# Patient Record
Sex: FEMALE | NOT HISPANIC OR LATINO | Employment: STUDENT | URBAN - METROPOLITAN AREA
[De-identification: names, ages, dates, MRNs, and addresses within clinical notes are randomized per-mention and may not be internally consistent; named-entity substitution may affect disease eponyms.]

---

## 2018-08-28 ENCOUNTER — TELEPHONE (OUTPATIENT)
Dept: RHEUMATOLOGY | Facility: CLINIC | Age: 33
End: 2018-08-28

## 2018-08-28 NOTE — TELEPHONE ENCOUNTER
[facility]    Phone Message    May a detailed message be left on voicemail: yes    Reason for Call: Other: Dianne Mendoza referring for Systemic Lupus.      Action Taken: Message routed to:  [facility] Rheum

## 2018-09-07 NOTE — TELEPHONE ENCOUNTER
Call was placed to patient regarding the referral we received for SLE from Dianne Kate at [facility], however, there was no answer. Message was left asking patient to call the clinic back to complete an intake form and discuss if there are outside records needed. Awaiting a call back from the patient.  Sharon Riley CMA  9/7/2018 11:22 AM

## 2018-09-07 NOTE — TELEPHONE ENCOUNTER
[facility] Call [facility]    Phone Message    May a detailed message be left on voicemail: yes    Reason for Call: Other: Pt was calling to return Sharon's call for the Intake Process.  Please have Sharon follow up with pt - pt is available any time after 11am.  Thank you!     Action Taken: Other: [facility]

## 2018-09-18 NOTE — TELEPHONE ENCOUNTER
Final attempt to complete the New Patient Intake process. Unfortunately, we are not able to move forward until the form is completed. Closing the referral request until we hear from the patient.   Sharon Riley CMA  9/18/2018 3:03 PM

## 2018-10-02 NOTE — TELEPHONE ENCOUNTER
[facility]    Phone Message    May a detailed message be left on voicemail: yes    Reason for Call: Other: The pt is calling to speak with Sharon and get her intake done. Please call the pt. The pt indicated she had called before but hadn't had a call back, not sure when. Thanks.      Action Taken: Message routed to:  [facility]

## 2018-10-03 NOTE — TELEPHONE ENCOUNTER
[facility]    Phone Message    May a detailed message be left on voicemail: yes    Reason for Call: Other: Pt calling again, requesting to speak with Sharon. She is upset as she states she has tried for several days to get ahold of her and has not received a call. She requests a call back tomorrow; she states she is best reached from 12:00 and after. Please advise.    Action Taken: Message routed to:  [facility] Rheum

## 2018-10-05 NOTE — TELEPHONE ENCOUNTER
[facility]    Phone Message    May a detailed message be left on voicemail: yes    Reason for Call: Other: Pt calling back for intake. she is avail all next week.      Action Taken: Message routed to:  [facility]

## 2018-10-12 NOTE — TELEPHONE ENCOUNTER
Call attempted to patient regarding the referral without success. Message left.   Sharon Riley CMA  10/12/2018 3:28 PM

## 2018-10-23 NOTE — TELEPHONE ENCOUNTER
Another attempt made to reach patient without success. Message left.  Sharon Riley CMA  10/23/2018 2:05 PM

## 2021-06-02 ENCOUNTER — RECORDS - HEALTHEAST (OUTPATIENT)
Dept: ADMINISTRATIVE | Facility: CLINIC | Age: 36
End: 2021-06-02

## 2021-06-16 PROBLEM — K59.01 SLOW TRANSIT CONSTIPATION: Status: ACTIVE | Noted: 2018-04-02

## 2021-06-16 PROBLEM — N18.9 CHRONIC RENAL FAILURE: Status: ACTIVE | Noted: 2018-03-31

## 2021-06-16 PROBLEM — N18.6 END STAGE RENAL DISEASE (H): Status: ACTIVE | Noted: 2018-04-01

## 2021-06-16 PROBLEM — I15.9 SECONDARY HYPERTENSION: Status: ACTIVE | Noted: 2017-03-21

## 2021-07-08 ENCOUNTER — TRANSFERRED RECORDS (OUTPATIENT)
Dept: HEALTH INFORMATION MANAGEMENT | Facility: CLINIC | Age: 36
End: 2021-07-08
Payer: COMMERCIAL

## 2022-01-05 ENCOUNTER — MEDICAL CORRESPONDENCE (OUTPATIENT)
Dept: HEALTH INFORMATION MANAGEMENT | Facility: CLINIC | Age: 37
End: 2022-01-05
Payer: COMMERCIAL

## 2022-01-06 ENCOUNTER — TRANSCRIBE ORDERS (OUTPATIENT)
Dept: MULTI SPECIALTY CLINIC | Facility: CLINIC | Age: 37
End: 2022-01-06
Payer: COMMERCIAL

## 2022-01-06 DIAGNOSIS — M32.9 SYSTEMIC LUPUS ERYTHEMATOSUS, UNSPECIFIED SLE TYPE, UNSPECIFIED ORGAN INVOLVEMENT STATUS (H): Primary | ICD-10-CM

## 2022-01-06 DIAGNOSIS — Z94.0 DECEASED-DONOR KIDNEY TRANSPLANT: ICD-10-CM

## 2023-01-02 ENCOUNTER — TRANSFERRED RECORDS (OUTPATIENT)
Dept: HEALTH INFORMATION MANAGEMENT | Facility: CLINIC | Age: 38
End: 2023-01-02

## 2023-01-02 ENCOUNTER — APPOINTMENT (OUTPATIENT)
Dept: RADIOLOGY | Facility: CLINIC | Age: 38
DRG: 698 | End: 2023-01-02
Attending: NURSE PRACTITIONER
Payer: MEDICARE

## 2023-01-02 ENCOUNTER — APPOINTMENT (OUTPATIENT)
Dept: CT IMAGING | Facility: CLINIC | Age: 38
DRG: 698 | End: 2023-01-02
Attending: EMERGENCY MEDICINE
Payer: MEDICARE

## 2023-01-02 ENCOUNTER — HOSPITAL ENCOUNTER (INPATIENT)
Facility: CLINIC | Age: 38
LOS: 4 days | Discharge: HOME | DRG: 698 | End: 2023-01-06
Attending: EMERGENCY MEDICINE | Admitting: STUDENT IN AN ORGANIZED HEALTH CARE EDUCATION/TRAINING PROGRAM
Payer: MEDICARE

## 2023-01-02 DIAGNOSIS — N12 PYELONEPHRITIS: ICD-10-CM

## 2023-01-02 DIAGNOSIS — L93.0 LUPUS ERYTHEMATOSUS, UNSPECIFIED FORM: Primary | ICD-10-CM

## 2023-01-02 DIAGNOSIS — A41.9 SEPSIS, DUE TO UNSPECIFIED ORGANISM, UNSPECIFIED WHETHER ACUTE ORGAN DYSFUNCTION PRESENT (H): ICD-10-CM

## 2023-01-02 DIAGNOSIS — R51.9 FRONTAL HEADACHE: ICD-10-CM

## 2023-01-02 PROBLEM — G47.00 INSOMNIA: Status: ACTIVE | Noted: 2019-09-25

## 2023-01-02 PROBLEM — G43.909 MIGRAINE HEADACHE: Status: ACTIVE | Noted: 2019-09-25

## 2023-01-02 LAB
ALBUMIN UR-MCNC: 300 MG/DL
ANION GAP SERPL CALCULATED.3IONS-SCNC: 11 MMOL/L (ref 5–18)
ANION GAP SERPL CALCULATED.3IONS-SCNC: 11 MMOL/L (ref 5–18)
APPEARANCE UR: ABNORMAL
BACTERIA #/AREA URNS HPF: ABNORMAL /HPF
BASOPHILS # BLD MANUAL: 0 10E3/UL (ref 0–0.2)
BASOPHILS NFR BLD MANUAL: 0 %
BILIRUB UR QL STRIP: NEGATIVE
BUN SERPL-MCNC: 35 MG/DL (ref 8–22)
BUN SERPL-MCNC: 39 MG/DL (ref 8–22)
CALCIUM SERPL-MCNC: 10.1 MG/DL (ref 8.5–10.5)
CALCIUM SERPL-MCNC: 9.1 MG/DL (ref 8.5–10.5)
CHLORIDE BLD-SCNC: 103 MMOL/L (ref 98–107)
CHLORIDE BLD-SCNC: 106 MMOL/L (ref 98–107)
CO2 SERPL-SCNC: 20 MMOL/L (ref 22–31)
CO2 SERPL-SCNC: 20 MMOL/L (ref 22–31)
COLOR UR AUTO: YELLOW
CREAT SERPL-MCNC: 2.01 MG/DL (ref 0.6–1.1)
CREAT SERPL-MCNC: 2.37 MG/DL (ref 0.6–1.1)
EOSINOPHIL # BLD MANUAL: 0 10E3/UL (ref 0–0.7)
EOSINOPHIL NFR BLD MANUAL: 0 %
ERYTHROCYTE [DISTWIDTH] IN BLOOD BY AUTOMATED COUNT: 14.7 % (ref 10–15)
FLUAV RNA SPEC QL NAA+PROBE: NEGATIVE
FLUBV RNA RESP QL NAA+PROBE: NEGATIVE
GFR SERPL CREATININE-BSD FRML MDRD: 26 ML/MIN/1.73M2
GFR SERPL CREATININE-BSD FRML MDRD: 32 ML/MIN/1.73M2
GLUCOSE BLD-MCNC: 101 MG/DL (ref 70–125)
GLUCOSE BLD-MCNC: 116 MG/DL (ref 70–125)
GLUCOSE UR STRIP-MCNC: NEGATIVE MG/DL
HCG SERPL QL: NEGATIVE
HCT VFR BLD AUTO: 35.3 % (ref 35–47)
HGB BLD-MCNC: 11.1 G/DL (ref 11.7–15.7)
HGB UR QL STRIP: ABNORMAL
KETONES UR STRIP-MCNC: ABNORMAL MG/DL
LACTATE SERPL-SCNC: 1.5 MMOL/L (ref 0.7–2)
LEUKOCYTE ESTERASE UR QL STRIP: ABNORMAL
LYMPHOCYTES # BLD MANUAL: 0.3 10E3/UL (ref 0.8–5.3)
LYMPHOCYTES NFR BLD MANUAL: 2 %
MCH RBC QN AUTO: 28 PG (ref 26.5–33)
MCHC RBC AUTO-ENTMCNC: 31.4 G/DL (ref 31.5–36.5)
MCV RBC AUTO: 89 FL (ref 78–100)
MONOCYTES # BLD MANUAL: 1.2 10E3/UL (ref 0–1.3)
MONOCYTES NFR BLD MANUAL: 8 %
MUCOUS THREADS #/AREA URNS LPF: PRESENT /LPF
NEUTROPHILS # BLD MANUAL: 13.1 10E3/UL (ref 1.6–8.3)
NEUTROPHILS NFR BLD MANUAL: 90 %
NITRATE UR QL: NEGATIVE
PH UR STRIP: 6 [PH] (ref 5–7)
PLAT MORPH BLD: ABNORMAL
PLATELET # BLD AUTO: 202 10E3/UL (ref 150–450)
POTASSIUM BLD-SCNC: 4.4 MMOL/L (ref 3.5–5)
POTASSIUM BLD-SCNC: 5 MMOL/L (ref 3.5–5)
RBC # BLD AUTO: 3.97 10E6/UL (ref 3.8–5.2)
RBC MORPH BLD: ABNORMAL
RBC URINE: 15 /HPF
RSV RNA SPEC NAA+PROBE: NEGATIVE
SARS-COV-2 RNA RESP QL NAA+PROBE: NEGATIVE
SODIUM SERPL-SCNC: 134 MMOL/L (ref 136–145)
SODIUM SERPL-SCNC: 137 MMOL/L (ref 136–145)
SP GR UR STRIP: 1.03 (ref 1–1.03)
SQUAMOUS EPITHELIAL: 2 /HPF
TRANSITIONAL EPI: <1 /HPF
UROBILINOGEN UR STRIP-MCNC: 2 MG/DL
WBC # BLD AUTO: 14.6 10E3/UL (ref 4–11)
WBC URINE: 18 /HPF

## 2023-01-02 PROCEDURE — 93010 ELECTROCARDIOGRAM REPORT: CPT | Mod: HIP | Performed by: INTERNAL MEDICINE

## 2023-01-02 PROCEDURE — 36415 COLL VENOUS BLD VENIPUNCTURE: CPT | Performed by: EMERGENCY MEDICINE

## 2023-01-02 PROCEDURE — 250N000011 HC RX IP 250 OP 636: Performed by: NURSE PRACTITIONER

## 2023-01-02 PROCEDURE — 250N000013 HC RX MED GY IP 250 OP 250 PS 637: Performed by: EMERGENCY MEDICINE

## 2023-01-02 PROCEDURE — 85027 COMPLETE CBC AUTOMATED: CPT | Performed by: NURSE PRACTITIONER

## 2023-01-02 PROCEDURE — 250N000009 HC RX 250: Performed by: EMERGENCY MEDICINE

## 2023-01-02 PROCEDURE — 70450 CT HEAD/BRAIN W/O DYE: CPT | Mod: MF

## 2023-01-02 PROCEDURE — 71046 X-RAY EXAM CHEST 2 VIEWS: CPT

## 2023-01-02 PROCEDURE — 99285 EMERGENCY DEPT VISIT HI MDM: CPT | Mod: 25

## 2023-01-02 PROCEDURE — 87086 URINE CULTURE/COLONY COUNT: CPT | Performed by: EMERGENCY MEDICINE

## 2023-01-02 PROCEDURE — 258N000003 HC RX IP 258 OP 636: Performed by: EMERGENCY MEDICINE

## 2023-01-02 PROCEDURE — 250N000011 HC RX IP 250 OP 636: Performed by: EMERGENCY MEDICINE

## 2023-01-02 PROCEDURE — 87040 BLOOD CULTURE FOR BACTERIA: CPT | Performed by: EMERGENCY MEDICINE

## 2023-01-02 PROCEDURE — 87637 SARSCOV2&INF A&B&RSV AMP PRB: CPT | Performed by: NURSE PRACTITIONER

## 2023-01-02 PROCEDURE — 272N000451 HC KIT SHRLOCK 5FR POWER PICC DOUBLE LUMEN

## 2023-01-02 PROCEDURE — 120N000001 HC R&B MED SURG/OB

## 2023-01-02 PROCEDURE — 36415 COLL VENOUS BLD VENIPUNCTURE: CPT | Performed by: NURSE PRACTITIONER

## 2023-01-02 PROCEDURE — 81001 URINALYSIS AUTO W/SCOPE: CPT | Performed by: EMERGENCY MEDICINE

## 2023-01-02 PROCEDURE — 36569 INSJ PICC 5 YR+ W/O IMAGING: CPT

## 2023-01-02 PROCEDURE — 74176 CT ABD & PELVIS W/O CONTRAST: CPT | Mod: MG

## 2023-01-02 PROCEDURE — 81003 URINALYSIS AUTO W/O SCOPE: CPT | Performed by: EMERGENCY MEDICINE

## 2023-01-02 PROCEDURE — 83605 ASSAY OF LACTIC ACID: CPT | Performed by: EMERGENCY MEDICINE

## 2023-01-02 PROCEDURE — 258N000003 HC RX IP 258 OP 636: Performed by: NURSE PRACTITIONER

## 2023-01-02 PROCEDURE — 84703 CHORIONIC GONADOTROPIN ASSAY: CPT | Performed by: NURSE PRACTITIONER

## 2023-01-02 PROCEDURE — 96375 TX/PRO/DX INJ NEW DRUG ADDON: CPT

## 2023-01-02 PROCEDURE — 85007 BL SMEAR W/DIFF WBC COUNT: CPT | Performed by: NURSE PRACTITIONER

## 2023-01-02 PROCEDURE — 80048 BASIC METABOLIC PNL TOTAL CA: CPT | Performed by: STUDENT IN AN ORGANIZED HEALTH CARE EDUCATION/TRAINING PROGRAM

## 2023-01-02 PROCEDURE — 96374 THER/PROPH/DIAG INJ IV PUSH: CPT

## 2023-01-02 PROCEDURE — 80048 BASIC METABOLIC PNL TOTAL CA: CPT | Performed by: NURSE PRACTITIONER

## 2023-01-02 PROCEDURE — 93005 ELECTROCARDIOGRAM TRACING: CPT

## 2023-01-02 RX ORDER — NALOXONE HYDROCHLORIDE 0.4 MG/ML
0.2 INJECTION, SOLUTION INTRAMUSCULAR; INTRAVENOUS; SUBCUTANEOUS
Status: DISCONTINUED | OUTPATIENT
Start: 2023-01-02 | End: 2023-01-06 | Stop reason: HOSPADM

## 2023-01-02 RX ORDER — LIDOCAINE 40 MG/G
CREAM TOPICAL
Status: DISCONTINUED | OUTPATIENT
Start: 2023-01-02 | End: 2023-01-04

## 2023-01-02 RX ORDER — ONDANSETRON 4 MG/1
4 TABLET, ORALLY DISINTEGRATING ORAL EVERY 6 HOURS PRN
Status: DISCONTINUED | OUTPATIENT
Start: 2023-01-02 | End: 2023-01-06 | Stop reason: HOSPADM

## 2023-01-02 RX ORDER — KETOROLAC TROMETHAMINE 15 MG/ML
15 INJECTION, SOLUTION INTRAMUSCULAR; INTRAVENOUS ONCE
Status: COMPLETED | OUTPATIENT
Start: 2023-01-02 | End: 2023-01-02

## 2023-01-02 RX ORDER — ONDANSETRON 4 MG/1
4 TABLET, ORALLY DISINTEGRATING ORAL EVERY 6 HOURS PRN
COMMUNITY
Start: 2023-01-01

## 2023-01-02 RX ORDER — NALOXONE HYDROCHLORIDE 0.4 MG/ML
0.4 INJECTION, SOLUTION INTRAMUSCULAR; INTRAVENOUS; SUBCUTANEOUS
Status: DISCONTINUED | OUTPATIENT
Start: 2023-01-02 | End: 2023-01-06 | Stop reason: HOSPADM

## 2023-01-02 RX ORDER — NORGESTIMATE AND ETHINYL ESTRADIOL 7DAYSX3 28
1 KIT ORAL DAILY
COMMUNITY
Start: 2022-11-28

## 2023-01-02 RX ORDER — PROCHLORPERAZINE MALEATE 10 MG
10 TABLET ORAL EVERY 6 HOURS PRN
Status: DISCONTINUED | OUTPATIENT
Start: 2023-01-02 | End: 2023-01-06 | Stop reason: HOSPADM

## 2023-01-02 RX ORDER — DOXYCYCLINE HYCLATE 100 MG
100 TABLET ORAL 2 TIMES DAILY
COMMUNITY
Start: 2023-01-01

## 2023-01-02 RX ORDER — PANTOPRAZOLE SODIUM 40 MG/1
40 TABLET, DELAYED RELEASE ORAL AT BEDTIME
COMMUNITY
Start: 2022-12-22

## 2023-01-02 RX ORDER — ACETAMINOPHEN 650 MG/1
650 SUPPOSITORY RECTAL EVERY 6 HOURS PRN
Status: DISCONTINUED | OUTPATIENT
Start: 2023-01-02 | End: 2023-01-02

## 2023-01-02 RX ORDER — TRAZODONE HYDROCHLORIDE 100 MG/1
100 TABLET ORAL AT BEDTIME
Status: DISCONTINUED | OUTPATIENT
Start: 2023-01-02 | End: 2023-01-06 | Stop reason: HOSPADM

## 2023-01-02 RX ORDER — OXYCODONE HYDROCHLORIDE 5 MG/1
5 TABLET ORAL EVERY 4 HOURS PRN
Status: DISCONTINUED | OUTPATIENT
Start: 2023-01-02 | End: 2023-01-04

## 2023-01-02 RX ORDER — ACETAMINOPHEN 325 MG/1
650 TABLET ORAL EVERY 6 HOURS PRN
Status: DISCONTINUED | OUTPATIENT
Start: 2023-01-02 | End: 2023-01-02

## 2023-01-02 RX ORDER — PROCHLORPERAZINE 25 MG
25 SUPPOSITORY, RECTAL RECTAL EVERY 12 HOURS PRN
Status: DISCONTINUED | OUTPATIENT
Start: 2023-01-02 | End: 2023-01-06 | Stop reason: HOSPADM

## 2023-01-02 RX ORDER — PREDNISONE 5 MG/1
5 TABLET ORAL DAILY
COMMUNITY

## 2023-01-02 RX ORDER — SULFAMETHOXAZOLE AND TRIMETHOPRIM 400; 80 MG/1; MG/1
1 TABLET ORAL
COMMUNITY
Start: 2022-12-16

## 2023-01-02 RX ORDER — SODIUM BICARBONATE 650 MG/1
650 TABLET ORAL 2 TIMES DAILY
Status: DISCONTINUED | OUTPATIENT
Start: 2023-01-02 | End: 2023-01-06 | Stop reason: HOSPADM

## 2023-01-02 RX ORDER — ACETAMINOPHEN 325 MG/1
650 TABLET ORAL ONCE
Status: COMPLETED | OUTPATIENT
Start: 2023-01-02 | End: 2023-01-02

## 2023-01-02 RX ORDER — CALCIUM CARBONATE 500 MG/1
3000 TABLET, CHEWABLE ORAL
COMMUNITY
Start: 2022-12-16

## 2023-01-02 RX ORDER — ONDANSETRON 2 MG/ML
4 INJECTION INTRAMUSCULAR; INTRAVENOUS ONCE
Status: COMPLETED | OUTPATIENT
Start: 2023-01-02 | End: 2023-01-02

## 2023-01-02 RX ORDER — TRAZODONE HYDROCHLORIDE 50 MG/1
100 TABLET, FILM COATED ORAL AT BEDTIME
COMMUNITY
Start: 2022-12-10

## 2023-01-02 RX ORDER — SULFAMETHOXAZOLE AND TRIMETHOPRIM 400; 80 MG/1; MG/1
1 TABLET ORAL
Status: DISCONTINUED | OUTPATIENT
Start: 2023-01-04 | End: 2023-01-02

## 2023-01-02 RX ORDER — ROSUVASTATIN CALCIUM 20 MG/1
20 TABLET, COATED ORAL AT BEDTIME
COMMUNITY
Start: 2022-10-28

## 2023-01-02 RX ORDER — PANTOPRAZOLE SODIUM 20 MG/1
40 TABLET, DELAYED RELEASE ORAL AT BEDTIME
Status: DISCONTINUED | OUTPATIENT
Start: 2023-01-02 | End: 2023-01-06 | Stop reason: HOSPADM

## 2023-01-02 RX ORDER — CHOLECALCIFEROL (VITAMIN D3) 25 MCG
25 TABLET ORAL 2 TIMES DAILY
COMMUNITY
Start: 2022-12-22

## 2023-01-02 RX ORDER — ACETAMINOPHEN 650 MG/1
650 SUPPOSITORY RECTAL EVERY 6 HOURS
Status: DISCONTINUED | OUTPATIENT
Start: 2023-01-02 | End: 2023-01-04

## 2023-01-02 RX ORDER — ROSUVASTATIN CALCIUM 10 MG/1
20 TABLET, COATED ORAL AT BEDTIME
Status: DISCONTINUED | OUTPATIENT
Start: 2023-01-02 | End: 2023-01-06 | Stop reason: HOSPADM

## 2023-01-02 RX ORDER — GABAPENTIN 300 MG/1
300 CAPSULE ORAL 3 TIMES DAILY PRN
Status: DISCONTINUED | OUTPATIENT
Start: 2023-01-02 | End: 2023-01-04

## 2023-01-02 RX ORDER — LIDOCAINE 40 MG/G
CREAM TOPICAL
Status: DISCONTINUED | OUTPATIENT
Start: 2023-01-02 | End: 2023-01-06 | Stop reason: HOSPADM

## 2023-01-02 RX ORDER — ONDANSETRON 2 MG/ML
4 INJECTION INTRAMUSCULAR; INTRAVENOUS EVERY 6 HOURS PRN
Status: DISCONTINUED | OUTPATIENT
Start: 2023-01-02 | End: 2023-01-06 | Stop reason: HOSPADM

## 2023-01-02 RX ORDER — AZATHIOPRINE 50 MG/1
75 TABLET ORAL DAILY
COMMUNITY

## 2023-01-02 RX ORDER — SODIUM BICARBONATE 650 MG/1
650 TABLET ORAL 2 TIMES DAILY
COMMUNITY
Start: 2022-09-19

## 2023-01-02 RX ORDER — ACETAMINOPHEN 325 MG/1
650 TABLET ORAL EVERY 6 HOURS
Status: DISCONTINUED | OUTPATIENT
Start: 2023-01-02 | End: 2023-01-05

## 2023-01-02 RX ORDER — SULFAMETHOXAZOLE AND TRIMETHOPRIM 400; 80 MG/1; MG/1
1 TABLET ORAL
Status: DISCONTINUED | OUTPATIENT
Start: 2023-01-04 | End: 2023-01-06 | Stop reason: HOSPADM

## 2023-01-02 RX ADMIN — SODIUM CHLORIDE 1000 ML: 9 INJECTION, SOLUTION INTRAVENOUS at 18:04

## 2023-01-02 RX ADMIN — SODIUM CHLORIDE 1000 ML: 9 INJECTION, SOLUTION INTRAVENOUS at 16:11

## 2023-01-02 RX ADMIN — KETOROLAC TROMETHAMINE 15 MG: 15 INJECTION, SOLUTION INTRAMUSCULAR; INTRAVENOUS at 16:13

## 2023-01-02 RX ADMIN — SODIUM CHLORIDE 1000 ML: 9 INJECTION, SOLUTION INTRAVENOUS at 21:09

## 2023-01-02 RX ADMIN — ACETAMINOPHEN 650 MG: 325 TABLET ORAL at 15:27

## 2023-01-02 RX ADMIN — SODIUM CHLORIDE 1000 ML: 9 INJECTION, SOLUTION INTRAVENOUS at 21:12

## 2023-01-02 RX ADMIN — CEFEPIME 2 G: 2 INJECTION, POWDER, FOR SOLUTION INTRAVENOUS at 21:09

## 2023-01-02 RX ADMIN — ACETAMINOPHEN 650 MG: 325 TABLET ORAL at 21:21

## 2023-01-02 RX ADMIN — ONDANSETRON 4 MG: 2 INJECTION INTRAMUSCULAR; INTRAVENOUS at 16:12

## 2023-01-02 RX ADMIN — VANCOMYCIN HYDROCHLORIDE 2000 MG: 5 INJECTION, POWDER, LYOPHILIZED, FOR SOLUTION INTRAVENOUS at 21:11

## 2023-01-02 RX ADMIN — LIDOCAINE HYDROCHLORIDE 2 ML: 10 INJECTION, SOLUTION EPIDURAL; INFILTRATION; INTRACAUDAL; PERINEURAL at 20:38

## 2023-01-02 ASSESSMENT — ENCOUNTER SYMPTOMS
COUGH: 0
FEVER: 1
SINUS PRESSURE: 1
DYSURIA: 1
DIARRHEA: 1
HEADACHES: 1
VOMITING: 0
MYALGIAS: 1
FREQUENCY: 1
NAUSEA: 1

## 2023-01-02 ASSESSMENT — ACTIVITIES OF DAILY LIVING (ADL)
ADLS_ACUITY_SCORE: 35
ADLS_ACUITY_SCORE: 37
ADLS_ACUITY_SCORE: 35

## 2023-01-02 NOTE — LETTER
[facility]  [address] [place] [id number]  Phone: [phone]  Fax: [phone]    January 6, 2023        Mey Dixon  [address] [facility] [facility] [address]          To whom it may concern:    RE: Mey Dixon was admitted to [facility] on 1/2/2023 and discharged on 1/6/2023.  Please excuse her from the work duties she had a during this time.      Please contact me for questions or concerns.      Sincerely,        Christopher Weeks, DO

## 2023-01-02 NOTE — ED TRIAGE NOTES
Pt reports onset of flu-like symptoms on Thursday. Started with headache and has developed congestion, cough, fever, nausea, body aches. Last dose tylenol at 0830 approximately. Pt is anxious in triage.    Was seen in urgency room last night. Tested negative for covid and flu. Diagnosed with sinus infection and discharged with antibiotics.     Triage Assessment     Row Name 01/02/23 1523       Triage Assessment (Adult)    Airway WDL WDL       Respiratory WDL    Respiratory WDL X;rhythm/pattern;cough    Rhythm/Pattern, Respiratory shortness of breath    Cough Frequency infrequent       Skin Circulation/Temperature WDL    Skin Circulation/Temperature WDL WDL       Cardiac WDL    Cardiac WDL X;rhythm    Pulse Rate & Regularity tachycardic       Peripheral/Neurovascular WDL    Peripheral Neurovascular WDL WDL       Cognitive/Neuro/Behavioral WDL    Cognitive/Neuro/Behavioral WDL X    Level of Consciousness alert    Arousal Level opens eyes spontaneously    Mood/Behavior anxious

## 2023-01-03 PROBLEM — L93.0 LUPUS ERYTHEMATOSUS: Status: ACTIVE | Noted: 2023-01-03

## 2023-01-03 PROBLEM — M87.00 AVASCULAR NECROSIS (H): Status: RESOLVED | Noted: 2023-01-03 | Resolved: 2023-01-03

## 2023-01-03 PROBLEM — M87.00 AVASCULAR NECROSIS (H): Status: ACTIVE | Noted: 2023-01-03

## 2023-01-03 PROBLEM — M87.059: Status: ACTIVE | Noted: 2023-01-03

## 2023-01-03 PROBLEM — N10 PYELONEPHRITIS, ACUTE: Status: ACTIVE | Noted: 2023-01-03

## 2023-01-03 PROBLEM — R00.0 SINUS TACHYCARDIA: Status: ACTIVE | Noted: 2023-01-03

## 2023-01-03 LAB
ANION GAP SERPL CALCULATED.3IONS-SCNC: 8 MMOL/L (ref 5–18)
ATRIAL RATE - MUSE: 120 BPM
BASOPHILS # BLD AUTO: 0 10E3/UL (ref 0–0.2)
BASOPHILS NFR BLD AUTO: 0 %
BUN SERPL-MCNC: 38 MG/DL (ref 8–22)
CALCIUM SERPL-MCNC: 8 MG/DL (ref 8.5–10.5)
CHLORIDE BLD-SCNC: 112 MMOL/L (ref 98–107)
CO2 SERPL-SCNC: 19 MMOL/L (ref 22–31)
CREAT SERPL-MCNC: 1.89 MG/DL (ref 0.6–1.1)
DIASTOLIC BLOOD PRESSURE - MUSE: NORMAL MMHG
EOSINOPHIL # BLD AUTO: 0 10E3/UL (ref 0–0.7)
EOSINOPHIL NFR BLD AUTO: 0 %
ERYTHROCYTE [DISTWIDTH] IN BLOOD BY AUTOMATED COUNT: 15.1 % (ref 10–15)
GFR SERPL CREATININE-BSD FRML MDRD: 35 ML/MIN/1.73M2
GLUCOSE BLD-MCNC: 100 MG/DL (ref 70–125)
HCT VFR BLD AUTO: 28.8 % (ref 35–47)
HGB BLD-MCNC: 9 G/DL (ref 11.7–15.7)
IMM GRANULOCYTES # BLD: 0.1 10E3/UL
IMM GRANULOCYTES NFR BLD: 1 %
INTERPRETATION ECG - MUSE: NORMAL
LACTATE SERPL-SCNC: 1.1 MMOL/L (ref 0.7–2)
LYMPHOCYTES # BLD AUTO: 0.2 10E3/UL (ref 0.8–5.3)
LYMPHOCYTES NFR BLD AUTO: 1 %
MCH RBC QN AUTO: 28.5 PG (ref 26.5–33)
MCHC RBC AUTO-ENTMCNC: 31.3 G/DL (ref 31.5–36.5)
MCV RBC AUTO: 91 FL (ref 78–100)
MONOCYTES # BLD AUTO: 1.6 10E3/UL (ref 0–1.3)
MONOCYTES NFR BLD AUTO: 13 %
NEUTROPHILS # BLD AUTO: 10.7 10E3/UL (ref 1.6–8.3)
NEUTROPHILS NFR BLD AUTO: 85 %
NRBC # BLD AUTO: 0 10E3/UL
NRBC BLD AUTO-RTO: 0 /100
P AXIS - MUSE: 58 DEGREES
PLATELET # BLD AUTO: 138 10E3/UL (ref 150–450)
POTASSIUM BLD-SCNC: 4.1 MMOL/L (ref 3.5–5)
PR INTERVAL - MUSE: 126 MS
QRS DURATION - MUSE: 68 MS
QT - MUSE: 306 MS
QTC - MUSE: 432 MS
R AXIS - MUSE: 18 DEGREES
RBC # BLD AUTO: 3.16 10E6/UL (ref 3.8–5.2)
SODIUM SERPL-SCNC: 139 MMOL/L (ref 136–145)
SYSTOLIC BLOOD PRESSURE - MUSE: NORMAL MMHG
T AXIS - MUSE: 31 DEGREES
VENTRICULAR RATE- MUSE: 120 BPM
WBC # BLD AUTO: 12.6 10E3/UL (ref 4–11)

## 2023-01-03 PROCEDURE — 250N000013 HC RX MED GY IP 250 OP 250 PS 637

## 2023-01-03 PROCEDURE — 258N000003 HC RX IP 258 OP 636: Performed by: STUDENT IN AN ORGANIZED HEALTH CARE EDUCATION/TRAINING PROGRAM

## 2023-01-03 PROCEDURE — 99222 1ST HOSP IP/OBS MODERATE 55: CPT | Performed by: PHYSICIAN ASSISTANT

## 2023-01-03 PROCEDURE — 99223 1ST HOSP IP/OBS HIGH 75: CPT | Mod: AI | Performed by: STUDENT IN AN ORGANIZED HEALTH CARE EDUCATION/TRAINING PROGRAM

## 2023-01-03 PROCEDURE — 80048 BASIC METABOLIC PNL TOTAL CA: CPT | Performed by: STUDENT IN AN ORGANIZED HEALTH CARE EDUCATION/TRAINING PROGRAM

## 2023-01-03 PROCEDURE — 250N000011 HC RX IP 250 OP 636: Performed by: STUDENT IN AN ORGANIZED HEALTH CARE EDUCATION/TRAINING PROGRAM

## 2023-01-03 PROCEDURE — 83605 ASSAY OF LACTIC ACID: CPT | Performed by: STUDENT IN AN ORGANIZED HEALTH CARE EDUCATION/TRAINING PROGRAM

## 2023-01-03 PROCEDURE — 250N000012 HC RX MED GY IP 250 OP 636 PS 637

## 2023-01-03 PROCEDURE — 120N000001 HC R&B MED SURG/OB

## 2023-01-03 PROCEDURE — 250N000013 HC RX MED GY IP 250 OP 250 PS 637: Performed by: STUDENT IN AN ORGANIZED HEALTH CARE EDUCATION/TRAINING PROGRAM

## 2023-01-03 PROCEDURE — 85025 COMPLETE CBC W/AUTO DIFF WBC: CPT | Performed by: STUDENT IN AN ORGANIZED HEALTH CARE EDUCATION/TRAINING PROGRAM

## 2023-01-03 RX ORDER — CALCIUM CARBONATE 500 MG/1
3000 TABLET, CHEWABLE ORAL
Status: DISCONTINUED | OUTPATIENT
Start: 2023-01-03 | End: 2023-01-06 | Stop reason: HOSPADM

## 2023-01-03 RX ORDER — PREDNISONE 5 MG/1
5 TABLET ORAL DAILY
Status: DISCONTINUED | OUTPATIENT
Start: 2023-01-03 | End: 2023-01-06 | Stop reason: HOSPADM

## 2023-01-03 RX ORDER — FLUTICASONE PROPIONATE 50 MCG
1 SPRAY, SUSPENSION (ML) NASAL DAILY
Status: DISCONTINUED | OUTPATIENT
Start: 2023-01-03 | End: 2023-01-06 | Stop reason: HOSPADM

## 2023-01-03 RX ORDER — SODIUM CHLORIDE 9 MG/ML
INJECTION, SOLUTION INTRAVENOUS CONTINUOUS
Status: DISCONTINUED | OUTPATIENT
Start: 2023-01-03 | End: 2023-01-05

## 2023-01-03 RX ORDER — VITAMIN B COMPLEX
25 TABLET ORAL 2 TIMES DAILY
Status: DISCONTINUED | OUTPATIENT
Start: 2023-01-03 | End: 2023-01-06 | Stop reason: HOSPADM

## 2023-01-03 RX ADMIN — ONDANSETRON 4 MG: 2 INJECTION INTRAMUSCULAR; INTRAVENOUS at 23:54

## 2023-01-03 RX ADMIN — ROSUVASTATIN CALCIUM 20 MG: 10 TABLET, FILM COATED ORAL at 00:10

## 2023-01-03 RX ADMIN — ACETAMINOPHEN 650 MG: 325 TABLET ORAL at 00:10

## 2023-01-03 RX ADMIN — OXYCODONE HYDROCHLORIDE 5 MG: 5 TABLET ORAL at 23:54

## 2023-01-03 RX ADMIN — PREDNISONE 5 MG: 5 TABLET ORAL at 13:08

## 2023-01-03 RX ADMIN — SODIUM BICARBONATE 650 MG TABLET 650 MG: at 00:10

## 2023-01-03 RX ADMIN — OXYCODONE HYDROCHLORIDE 5 MG: 5 TABLET ORAL at 01:13

## 2023-01-03 RX ADMIN — CEFEPIME 2 G: 2 INJECTION, POWDER, FOR SOLUTION INTRAVENOUS at 08:52

## 2023-01-03 RX ADMIN — ACETAMINOPHEN 650 MG: 325 TABLET ORAL at 22:36

## 2023-01-03 RX ADMIN — SODIUM BICARBONATE 650 MG TABLET 650 MG: at 20:20

## 2023-01-03 RX ADMIN — ROSUVASTATIN CALCIUM 20 MG: 10 TABLET, FILM COATED ORAL at 20:20

## 2023-01-03 RX ADMIN — TRAZODONE HYDROCHLORIDE 100 MG: 100 TABLET ORAL at 00:10

## 2023-01-03 RX ADMIN — Medication 75 MG: at 13:08

## 2023-01-03 RX ADMIN — SODIUM CHLORIDE: 9 INJECTION, SOLUTION INTRAVENOUS at 15:48

## 2023-01-03 RX ADMIN — OXYCODONE HYDROCHLORIDE 5 MG: 5 TABLET ORAL at 11:45

## 2023-01-03 RX ADMIN — FLUTICASONE PROPIONATE 1 SPRAY: 50 SPRAY, METERED NASAL at 11:36

## 2023-01-03 RX ADMIN — ACETAMINOPHEN 650 MG: 325 TABLET ORAL at 16:57

## 2023-01-03 RX ADMIN — PANTOPRAZOLE SODIUM 40 MG: 20 TABLET, DELAYED RELEASE ORAL at 00:10

## 2023-01-03 RX ADMIN — TRAZODONE HYDROCHLORIDE 100 MG: 100 TABLET ORAL at 20:20

## 2023-01-03 RX ADMIN — SODIUM CHLORIDE: 9 INJECTION, SOLUTION INTRAVENOUS at 02:27

## 2023-01-03 RX ADMIN — VANCOMYCIN HYDROCHLORIDE 1000 MG: 5 INJECTION, POWDER, LYOPHILIZED, FOR SOLUTION INTRAVENOUS at 21:22

## 2023-01-03 RX ADMIN — ACETAMINOPHEN 650 MG: 325 TABLET ORAL at 11:36

## 2023-01-03 RX ADMIN — SODIUM CHLORIDE: 9 INJECTION, SOLUTION INTRAVENOUS at 23:54

## 2023-01-03 RX ADMIN — GABAPENTIN 300 MG: 300 CAPSULE ORAL at 23:54

## 2023-01-03 RX ADMIN — SODIUM BICARBONATE 650 MG TABLET 650 MG: at 08:52

## 2023-01-03 RX ADMIN — OXYCODONE HYDROCHLORIDE 5 MG: 5 TABLET ORAL at 07:00

## 2023-01-03 RX ADMIN — ACETAMINOPHEN 650 MG: 325 TABLET ORAL at 05:17

## 2023-01-03 RX ADMIN — PANTOPRAZOLE SODIUM 40 MG: 20 TABLET, DELAYED RELEASE ORAL at 20:20

## 2023-01-03 RX ADMIN — CALCIUM CARBONATE (ANTACID) CHEW TAB 500 MG 3000 MG: 500 CHEW TAB at 20:29

## 2023-01-03 RX ADMIN — CEFEPIME 2 G: 2 INJECTION, POWDER, FOR SOLUTION INTRAVENOUS at 20:14

## 2023-01-03 ASSESSMENT — ACTIVITIES OF DAILY LIVING (ADL)
ADLS_ACUITY_SCORE: 37

## 2023-01-03 NOTE — PHARMACY-VANCOMYCIN DOSING SERVICE
Pharmacy Vancomycin Initial Note  Date of Service 2023  Patient's  1985  37 year old, female    Indication: Sepsis    Current estimated CrCl = Estimated Creatinine Clearance: 43.8 mL/min (A) (based on SCr of 2.01 mg/dL (H)).    Creatinine for last 3 days  2023:  4:07 PM Creatinine 2.01 mg/dL    Recent Vancomycin Level(s) for last 3 days  No results found for requested labs within last 72 hours.      Vancomycin IV Administrations (past 72 hours)      No vancomycin orders with administrations in past 72 hours.                Nephrotoxins and other renal medications (From now, onward)    Start     Dose/Rate Route Frequency Ordered Stop    23  vancomycin (VANCOCIN) 2,000 mg in 0.9% NaCl 500 mL intermittent infusion         2,000 mg  over 2 Hours Intravenous ONCE 23 193            Contrast Orders - past 72 hours (72h ago, onward)    None                Plan:  1. Start vancomycin  2000 mg IV once In ED  2. Please re-consult pharmacy to dose if continued inpatient  Caitlin Avila RPH

## 2023-01-03 NOTE — PLAN OF CARE
Goal Outcome Evaluation:      Plan of Care Reviewed With: patient    Overall Patient Progress: improvingOverall Patient Progress: improving    Alert and oriented x4, able to make needs known, IVF running at 125 ml/hr, stand by assist to commode, PICC in place and patent, on telemetry, read Sinus tachy x 2, recent temperature is 100.8 F, c/o shivers and clamminess, scheduled tylenol was effective, denies nausea and dysuria, urine was orange, had loose BM x 1, updated mom via phone, c/o head/ear pain, gave 5 mg PRN oxy x 2 for good relief.

## 2023-01-03 NOTE — PROGRESS NOTES
Care Management Follow Up    Length of Stay (days): 1    Expected Discharge Date: 01/04/2023     Concerns to be Addressed: no discharge needs identified     Patient plan of care discussed at interdisciplinary rounds: Yes    Anticipated Discharge Disposition: Home     Anticipated Discharge Services:  None anticipated   Anticipated Discharge DME:  None anticipated   Patient/family educated on Medicare website which has current facility and service quality ratings:  No  Education Provided on the Discharge Plan: No    Patient/Family in Agreement with the Plan: yes    Referrals Placed by CM/SW:  None   Private pay costs discussed: Not applicable    Additional Information:  Chart reviewed.  No CM need identified at this time.  Family to transport.       JOSÉ MIGUEL Martel

## 2023-01-03 NOTE — PROVIDER NOTIFICATION
Patient came to the floor at 2210.     Provider notified:  patient c/o 10/10 pain in head and ears. Can we have something for pain? Temp continues to be high, 102.8, Tylenol given 1 hr ago.       Orders placed for gabapentin and prn oxy if headache unrelieved by gabapentin

## 2023-01-03 NOTE — PROGRESS NOTES
[facility]    Progress Note - Hospitalist Service       Date of Admission:  1/2/2023    Assessment & Plan      Mey Dixon is a 37 year old old female with a past medical history of lupus s/p right renal transplant (2019), who was admitted for sepsis due to pyelonephritis.     Sepsis  Right pyelonephritis  S/p R Renal Transplant   Febrile, tachycardic, and hypertensive.  Received Toradol and 2 L of NS down in ED.  CT abdomen pelvis with trace transplant kidney perinephric stranding.  Condition relatively unchanged during the day 1/3, nephrology now following.  We will continue IV fluids and provide supportive cares.  Immunosuppressants restarted today.    Nephrology consult, appreciate recommendations    Recommend supportive cares with ongoing IVF, plan for expectant management    Continue immunosuppression    Draw tacrolimus level 1/5    Continue oral bicarbonate    Follow urine and blood cultures    Continue cefepime (renal dose) and vancomycin (1/2 - )    Scheduled Tylenol, hold while asleep    Telemetry    Zofran and Compazine as needed    Pain control with gabapentin, oxycodone as needed    Maintenance IV fluids     AM BMP and CBC    Renal diet    PTA Bactrim prophylaxis     Tachycardia  Likely related to fever as above. EKG shows sinus tachycardia.     Holosystolic murmur  Previously noted to have murmur related to her anemia.  Intermittently present.    Monitor, if blood cultures positive then consider echo.  Cultures currently NGTD     Lupus  Reportedly on prednisone, azathioprine, and tacrolimus    PharmD confirming doses in AM. Pt skipped 1/2/23 dose due to nausea, vomiting.      Left ear pain  Frontal sinus pain  Congestion  Possible that she is developing a viral sinusitis however exam is not very revealing with normal appearing TMs, no rhinorrhea, no drainage of the posterior pharynx. Started flonase every day, will monitor for symptom relief and re-evaluate  "daily.    Avascular necrosis of femoral heads  Stable and mild, asymptomatic    Monitor        Chronic Conditions  GERD: Continue PTA Protonix  Hyperlipidemia: Continue PTA rosuvastatin  Depression: Continue PTA trazodone         Diet: Renal Diet (non-dialysis)    DVT Prophylaxis: Pneumatic Compression Devices  Morel Catheter: Not present  Fluids: 0.9% NS at 125 mL/HR  Lines: PRESENT      PICC 01/02/23 Double Lumen Right Brachial vein medial-Site Assessment: WDL      Cardiac Monitoring: ACTIVE order. Indication: sepsis  Code Status: Full Code      Clinically Significant Risk Factors Present on Admission                       # Obesity: Estimated body mass index is 35.49 kg/m  as calculated from the following:    Height as of this encounter: 1.638 m (5' 4.5\").    Weight as of this encounter: 95.3 kg (210 lb).           Disposition Plan      Expected Discharge Date: 01/05/2023      Destination: home  Discharge Comments: Monitor Kidneys        The patient's care was discussed with the Attending Physician, Dr. Mable Galol.    Christopher Weeks DO  Hospitalist Service  [facility]  Securely message with [employer] (more info)  Text page via [facility] Paging/Directory   ______________________________________________________________________    Interval History   Patient was seen in bed she was laying comfortably.  She states her myalgias have improved however she is still feeling intermittent back pain and nausea.  No vomiting or diarrhea, no longer having fevers or chills.  Breathing well, no chest pain.  Appetite not particularly strong.  Still endorsing left ear pain as well as sinus pressure.  Informed patient we would put in Flonase for her.    Physical Exam   Vital Signs: Temp: (!) 102.5  F (39.2  C) Temp src: Oral BP: (!) 163/92 Pulse: (!) 125   Resp: 18 SpO2: 98 % O2 Device: None (Room air)    Weight: 210 lbs 0 oz    Constitutional: Awake, alert, cooperative, no apparent distress, and appears " stated age.  Eyes: Lids and lashes normal, extra ocular muscles intact, sclera clear, conjunctiva normal.  ENT: Normocephalic, atraumatic. Moist mucous membranes.  TM appear normal bilaterally.  No rhinorrhea.  Nares clear and nonedematous nonerythematous bilaterally.  Respiratory: No increased work of breathing, no accessory muscle use, clear to auscultation bilaterally, no crackles or wheezing.  Cardiovascular: Regular rate and rhythm, normal S1 and S2, no S3 or S4, and no murmur noted  GI: Abdomen soft, non-tender, non-distended, no masses palpated. Bowel sounds present.  No tenderness to palpation.  Skin: No bruising or bleeding and normal skin color, texture, turgor.  Musculoskeletal: There is no redness, warmth, or swelling of the joints.  Full range of motion noted.  Tone is normal.  Neurologic: Awake, alert, oriented to name, place and time.  Cranial nerves II-XII are grossly intact.  Sensation intact bilaterally.        Data   ------------------------- PAST 24 HR DATA REVIEWED -----------------------------------------------    I have personally reviewed the following data over the past 24 hrs:    12.6 (H)  \   9.0 (L)   / 138 (L)     139 112 (H) 38 (H) /  100   4.1 19 (L) 1.89 (H) \       Procal: N/A CRP: N/A Lactic Acid: 1.5         Imaging results reviewed over the past 24 hrs:   Recent Results (from the past 24 hour(s))   Chest XR,  PA & LAT    Narrative    EXAM: XR CHEST 2 VIEWS  LOCATION: [facility]  DATE/TIME: 01/02/2023, 4:31 PM    INDICATION: Cough, fever.  COMPARISON: Chest x-ray 09/30/2022.      Impression    IMPRESSION: Negative chest.     Head CT w/o contrast    Narrative    EXAM: CT HEAD W/O CONTRAST  LOCATION: [facility]  DATE/TIME: 1/2/2023 7:02 PM    INDICATION: fever, headache, immusupprsed  COMPARISON: 09/30/2022.  TECHNIQUE: Routine CT Head without IV contrast. Multiplanar reformats. Dose reduction techniques were  used.    FINDINGS:  INTRACRANIAL CONTENTS: No intracranial hemorrhage, extraaxial collection, or mass effect.  No CT evidence of acute infarct. Normal parenchymal attenuation. Normal ventricles and sulci.     VISUALIZED ORBITS/SINUSES/MASTOIDS: No intraorbital abnormality. No paranasal sinus mucosal disease. No middle ear or mastoid effusion.    BONES/SOFT TISSUES: No acute abnormality.      Impression    IMPRESSION:  1.  No CT finding of a mass, hemorrhage or focal area suggestive of acute infarct.   CT Abdomen Pelvis w/o Contrast    Narrative    EXAM: CT ABDOMEN PELVIS W/O CONTRAST  LOCATION: [facility]  DATE/TIME: 1/2/2023 7:07 PM    INDICATION: fever, UTI, transplanted kidney  COMPARISON: 9/30/2022  TECHNIQUE: CT scan of the abdomen and pelvis was performed without IV contrast. Multiplanar reformats were obtained. Dose reduction techniques were used.  CONTRAST: None.    FINDINGS:   LOWER CHEST: Normal.    HEPATOBILIARY: Hepatic steatosis.    PANCREAS: Normal.    SPLEEN: Normal.    ADRENAL GLANDS: Normal.    NATIVE KIDNEYS/BLADDER: The kidneys are atrophic. A simple cyst lower pole right kidney, no follow-up required. No hydronephrosis.    TRANSPLANT KIDNEY: A right pelvic transplant kidney. There are 3 stable adjacent stones in the upper pole with the largest measuring 0.6 cm. No hydronephrosis. Trace perinephric fat stranding.    BOWEL: No obstruction or inflammatory change. Normal appendix.    LYMPH NODES: Normal.    VASCULATURE: Unremarkable.    PELVIC ORGANS: Normal.    MUSCULOSKELETAL: Stable mild subchondral sclerosis in the femoral head is consistent with avascular necrosis.      Impression    IMPRESSION:   1.  No acute abnormality.  2.  Nephrolithiasis of the transplant kidney.  3.  Hepatic steatosis.  4.  Avascular necrosis of the femoral heads.

## 2023-01-03 NOTE — PHARMACY-ADMISSION MEDICATION HISTORY
Pharmacy Note - Admission Medication History    Pertinent Provider Information:     Was notified by provider that patient is likely on immunosuppressants:  tacrolimus, azathioprine, and prednisone.     In my initial interview, patient did not mention taking but I also didn't ask- there was no record in [employer] or CareEverywhere.  There is another chart associated with patient marked for merge, MRN [id number]. I was able to view limited information in CareEverywhere from Feb 2022 from [facility], presribed Envarsus XR 4 mg strength. Further review in prescription records through reconcile outside meds ([facility] and [facility]) found the following:  prednisone 5 mg daily dose,  azathioprine 50 mg ( take x 1.5 = 75 mg daily dose) and Envarsus XR 4 mg (take x 2 = 8 mg dose).     I spoke with patient later tonight, she confirms taking tacrolimus (Envarsus XR 4 mg + 3x1 mg of XR or regular?), prednisone 5 mg daily, and azathioprine 75 mg (unclear if 1 x 75 mg or 1.5 x 50 mg).     We will call [facility] in morning to confirm fill history, [phone].      ______________________________________________________________________    Prior To Admission (PTA) med list completed and updated in EMR.       PTA Med List   Medication Sig Note Last Dose     azaTHIOprine 75 MG TABS Take 75 mg by mouth daily 1/2/2023: Record from [facility] - see MRN [id number]; call [facility] to confirm. Patient reports taking. Taking 1 x 75 mg or 1.5 x 50 mg?  1/1/2023     CALCIUM ANTACID 500 MG chewable tablet Take 3,000 mg by mouth nightly as needed  Past Week     diphenhydrAMINE-acetaminophen (TYLENOL PM)  MG tablet Take 2 tablets by mouth nightly as needed for sleep  12/30/2022     doxycycline hyclate (VIBRA-TABS) 100 MG tablet Take 100 mg by mouth 2 times daily  1/2/2023 at took initial dose     ondansetron (ZOFRAN ODT) 4 MG ODT tab Take 4 mg by mouth every 6 hours as needed  1/1/2023     pantoprazole  (PROTONIX) 40 MG EC tablet Take 40 mg by mouth At Bedtime  12/30/2022     predniSONE (DELTASONE) 5 MG tablet Take 5 mg by mouth daily 1/2/2023: Record from [facility] MRN [id number]; call [facility] to confirm. Patient reports taking 5 mg daily.  1/1/2023     rosuvastatin (CRESTOR) 20 MG tablet Take 20 mg by mouth At Bedtime  12/30/2022     sodium bicarbonate 650 MG tablet Take 650 mg by mouth 2 times daily  1/1/2023     tacrolimus (ENVARSUS XR) 1 MG 24 hr tablet Take 3 mg by mouth every morning (before breakfast) 1/2/2023: Record from [facility] MRN [id number]; call [facility] to confirm. Patient reports taking 7 mg (not 8 mg), using 1x4 mg XR plus 3x1mg (XR?) daily.  1/1/2023     tacrolimus (ENVARSUS XR) 4 MG 24 hr tablet Take 4 mg by mouth every morning (before breakfast) 1/2/2023: Record from [facility] MRN [id number]; call [facility] to confirm. Patient reports taking 7 mg (not 8mg) ,using 1x4 mg XR plus 3x1mg (XR?) daily.  1/1/2023     traZODone (DESYREL) 50 MG tablet Take 100 mg by mouth At Bedtime daily  12/30/2022     TRI-SPRINTEC 0.18/0.215/0.25 MG-35 MCG tablet Take 1 tablet by mouth daily  1/1/2023     VITAMIN D3 25 MCG (1000 UT) tablet Take 25 mcg by mouth 2 times daily  1/1/2023       Information source(s): [facility]  Method of interview communication: in-person    Summary of Changes to PTA Med List  New: Protonix, Tums, Vit D, TriSprintec, rosuvastatin, doxycycline, Zofran ODT, sodium bicarb, trazodone, SMX-TMP, Tylenol PM  Discontinued: simethicone  Changed: takes Tums, Protonix, rosuvastatin qhs    Patient was asked about OTC/herbal products specifically.  PTA med list reflects this.    In the past week, patient estimated taking medication this percent of the time:  50-90% due to illness.    Allergies were reviewed, assessed, and updated with the patient.      Patient does not use any multi-dose medications prior to  admission.    The information provided in this note is only as accurate as the sources available at the time of the update(s).    Thank you for the opportunity to participate in the care of this patient.    Fiona Mabry RPH  1/2/2023 10:59 PM

## 2023-01-03 NOTE — PHARMACY-VANCOMYCIN DOSING SERVICE
Pharmacy Vancomycin Note  Date of Service January 3, 2023  Patient's  1985   37 year old, female    Indication: Pyelonephritis and Sepsis  Day of Therapy: 2  Current vancomycin regimen:  1000 mg IV q24h  Current vancomycin monitoring method: AUC  Current vancomycin therapeutic monitoring goal: 400-600 mg*h/L    InsightRX Prediction of Current Vancomycin Regimen  Regimen: 1000 mg IV every 24 hours.  Start time: 12:43 on 2023  Exposure target: AUC24 (range)400-600 mg/L.hr   AUC24,ss: 380 mg/L.hr  Probability of AUC24 > 400: 44 %  Ctrough,ss: 11.7 mg/L  Probability of Ctrough,ss > 20: 10 %  Probability of nephrotoxicity (Lodise JOLIE ): 7 %      Current estimated CrCl = Estimated Creatinine Clearance: 46.6 mL/min (A) (based on SCr of 1.89 mg/dL (H)).    Creatinine for last 3 days  2023:  4:07 PM Creatinine 2.01 mg/dL;  9:13 PM Creatinine 2.37 mg/dL  1/3/2023:  7:06 AM Creatinine 1.89 mg/dL    Recent Vancomycin Levels (past 3 days)  No results found for requested labs within last 72 hours.    Vancomycin IV Administrations (past 72 hours)                   vancomycin (VANCOCIN) 2,000 mg in 0.9% NaCl 500 mL intermittent infusion (mg) 2,000 mg New Bag 23                Nephrotoxins and other renal medications (From now, onward)    Start     Dose/Rate Route Frequency Ordered Stop    23 0730  tacrolimus (ENVARSUS XR) 24 hr tablet 3 mg        Note to Pharmacy: PTA Sig:Take 3 mg by mouth every morning (before breakfast) (Total of 7mg in AM)      3 mg Oral EVERY MORNING BEFORE BREAKFAST 23 1231      23 0730  tacrolimus (ENVARSUS XR) 24 hr tablet 4 mg        Note to Pharmacy: PTA Sig:Take 4 mg by mouth every morning (before breakfast) (Total of 7mg in morning)      4 mg Oral EVERY MORNING BEFORE BREAKFAST 23 1231      23 2100  vancomycin (VANCOCIN) 1,000 mg in 0.9% NaCl 250 mL intermittent infusion         1,000 mg  over 60 Minutes Intravenous EVERY 24 HOURS 23  2338               Contrast Orders - past 72 hours (72h ago, onward)    None          Renal function has improved , so even though no level was done yet , dose has been adjusted based on insight predictions    Has serum creatinine changed greater than 50% in last 72 hours: No . Although has improved    Urine output: diminished urine output    Renal Function: Improving    InsightRX Prediction of Planned New Vancomycin Regimen  Regimen: 1000 mg IV every 18 hours.  Start time: 12:43 on 01/03/2023  Exposure target: AUC24 (range)400-600 mg/L.hr   AUC24,ss: 496 mg/L.hr  Probability of AUC24 > 400: 73 %  Ctrough,ss: 16.3 mg/L  Probability of Ctrough,ss > 20: 32 %  Probability of nephrotoxicity (Lodise JOLIE 2009): 12 %      Plan:  1. Increase Dose to 1000mg Q18 hours  2. Vancomycin monitoring method: AUC  3. Vancomycin therapeutic monitoring goal: 400-600 mg*h/L  4. Pharmacy will check vancomycin levels as appropriate in 1-3 Days.  5. Serum creatinine levels will be ordered daily for the first week of therapy and at least twice weekly for subsequent weeks.    Bernarda Mendieta, [facility]

## 2023-01-03 NOTE — CONSULTS
RENAL CONSULT NOTE    REQUESTING PHYSICIAN: Dr. Peters     REASON FOR CONSULT: Pyelonephritis and KECIA in renal transplant patient     ASSESSMENT/PLAN:    KECIA - Baseline ESRD on dialysis for about two years prior to renal transplant in 2019. Baseline Cr appears to be 1.5-1.7, last labs almost one year ago Jan 2022. Cr peaked at 2.4, improved to 1.9 with IVF suggesting prerenal etiology with poor oral intakes and nausea and pyelonephritis. Less likely to be an acute rejection but following closely with missed doses of IS. Recommend supportive cares with ongoing IVF for now and plan for expectant management.     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 Follows with our service through [facility] [place] [place] transplant clinic. Immunosuppression as below.     Chronic immunosuppression - On Azathioprine 75 mg daily, prednisone 5 mg, and tacrolimus XR 7 mg QAM. On Bactrim MWF for PJP prophylaxis. Timing of IS drugs off today with delayed med rec, will draw Tacrolimus level 1/5. Expect it may be low with missed doses.     Pyelonephritis - Febrile with leukocytosis, turbid urine with microhematuria, pyuria, proteinuria, and pending UCx, BCx NGTD. On cefepime and Vancomycin per primary service.     Metabolic acidosis - Mild, continue oral bicarbonate for now     SLE - complicated by lupus nephritis as above. On tacrolimus, prednisone, and AZA. Follows with rheumatology as an outpatient.     Hyperlipidemia - on statin     HPI: Mey Dixon is a 38 yo F with PMH of ESRD secondary to lupus nephritis s/p DDKT in 2019 who presented to [facility] ED with complaint of flu-like symptoms of fever, chills, nausea, vomiting, and dysuria. She was noted to have perinephric fat stranding of transplanted kidney and UA consistent with infection and was admitted for treatment of presumed pyelonephritis. Her Cr on admission peaked at 2.4 but had improved to 1.9 at the time of consult with IV fluid resuscitation.     On interview patient is not  feeling much better today. Chief complaint now is migraine headache and nasal congestion. She continues to have some nausea; reports this started on Friday and had persistent nausea and dry heaves but not actual vomiting. She reports she began noticing dysuria the day before admission but denies any issues with bladder emptying. Denies diamond hematuria or frothy urine. Denies shortness of breath or new LE edema. Tells me she is typically compliant with her immunosuppressants and other medications but missed doses Saturday and Monday, plus a few doses of her evening meds other days over the weekend. Appetite is still poor today, had a few bites of toast but has been able to keep fluids down.     REVIEW OF SYSTEMS:  Comprehensive ROS negative except per HPI     ALLERGIES/SENSITIVITIES:  Allergies   Allergen Reactions     Cellcept [Mycophenolate] Diarrhea and GI Disturbance                PHYSICAL EXAM:  Physical Exam   Temp: 100.2  F (37.9  C) Temp src: Oral BP: 128/73 Pulse: 115   Resp: 18 SpO2: 94 % O2 Device: None (Room air)    Vitals:    01/02/23 1521   Weight: 95.3 kg (210 lb)     Vital Signs with Ranges  Temp:  [99.5  F (37.5  C)-103.2  F (39.6  C)] 100.2  F (37.9  C)  Pulse:  [] 115  Resp:  [18-26] 18  BP: ()/(55-89) 128/73  SpO2:  [94 %-98 %] 94 %  I/O last 3 completed shifts:  In: 1010 [P.O.:10; IV Piggyback:1000]  Out: 200 [Urine:200]    @TMAXR(24)@    Patient Vitals for the past 72 hrs:   Weight   01/02/23 1521 95.3 kg (210 lb)       General - A & O x 3, NAD, ill appearing   HEENT - PERRLA, no scleral icterus  Neck - no carotid bruits, no JVD  Respiratory - Lungs CTA bilat without wheeze, rhonchi, rales  Cardiovascular - AP RRR with murmur  Abdomen - soft, BS present, no bruits, no fluid wave  Extremities - well perfused, no edema  Integumentary - intact, good turgor, no rash/lesions  Neurologic - grossly intact  Psych:  Judgement intact, affect WNL    Laboratory:     Recent Labs   Lab  01/03/23  0706 01/02/23  1607   WBC 12.6* 14.6*   RBC 3.16* 3.97   HGB 9.0* 11.1*   HCT 28.8* 35.3   * 202       Basic Metabolic Panel:  Recent Labs   Lab 01/03/23  0706 01/02/23 2113 01/02/23  1607    137 134*   POTASSIUM 4.1 5.0 4.4   CHLORIDE 112* 106 103   CO2 19* 20* 20*   BUN 38* 39* 35*   CR 1.89* 2.37* 2.01*    101 116   JUNI 8.0* 9.1 10.1       INRNo lab results found in last 7 days.    Recent Labs   Lab Test 01/03/23  0706 01/02/23  2113   POTASSIUM 4.1 5.0   CHLORIDE 112* 106   BUN 38* 39*      Recent Labs   Lab Test 01/02/23  1744   PROTEIN 300*       Personally reviewed today's laboratory studies      Thank you for involving us in the care of this patient. We will continue to follow along with you.    Leonor Patel PA-C   [facility]  [phone]

## 2023-01-03 NOTE — PLAN OF CARE
Problem: Fall Injury Risk  Goal: Absence of Fall and Fall-Related Injury  Outcome: Progressing   Goal Outcome Evaluation:       Continues febrile this shift, scheduled tylenol given. Moderate amount of dark orange urine.  Skin intact. Continues with sinus/ ear discomfort.  Alert and oriented.  Skin intact.  Picc RU x2, patent.  Use of call light enforced for safety.

## 2023-01-03 NOTE — PHARMACY-VANCOMYCIN DOSING SERVICE
Pharmacy Vancomycin Initial Note  Date of Service 2023  Patient's  1985  37 year old, female    Indication: Pyelonephritis    Current estimated CrCl = Estimated Creatinine Clearance: 37.1 mL/min (A) (based on SCr of 2.37 mg/dL (H)).    Creatinine for last 3 days  2023:  4:07 PM Creatinine 2.01 mg/dL;  9:13 PM Creatinine 2.37 mg/dL    Recent Vancomycin Level(s) for last 3 days  No results found for requested labs within last 72 hours.      Vancomycin IV Administrations (past 72 hours)                   vancomycin (VANCOCIN) 2,000 mg in 0.9% NaCl 500 mL intermittent infusion (mg) 2,000 mg New Bag 23 2111                Nephrotoxins and other renal medications (From now, onward)    Start     Dose/Rate Route Frequency Ordered Stop    23 2100  vancomycin (VANCOCIN) 1,000 mg in 0.9% NaCl 250 mL intermittent infusion         1,000 mg  over 60 Minutes Intravenous EVERY 24 HOURS 23 2338            Contrast Orders - past 72 hours (72h ago, onward)    None          Insight[employer] Prediction of Planned Initial Vancomycin Regimen     Regimen: 1000 mg IV every 24 hours.  Start time: 00:36 on 2023  Exposure target: AUC24 (range)400-600 mg/L.hr   AUC24,ss: 456 mg/L.hr  Probability of AUC24 > 400: 64 %  Ctrough,ss: 14.7 mg/L  Probability of Ctrough,ss > 20: 23 %  Probability of nephrotoxicity (Lodise JOLIE ): 10 %       Plan: 2000mg load in ED  1. Start vancomycin  1000 mg IV q24h.   2. Vancomycin monitoring method: AUC  3. Vancomycin therapeutic monitoring goal: 400-600 mg*h/L  4. Pharmacy will check vancomycin levels as appropriate in 1-3 Days.    5. Serum creatinine levels will be ordered daily for the first week of therapy and at least twice weekly for subsequent weeks.      Gely Malone [facility]

## 2023-01-03 NOTE — PLAN OF CARE
Problem: Plan of Care - These are the overarching goals to be used throughout the patient stay.    Goal: Optimal Comfort and Wellbeing  Intervention: Monitor Pain and Promote Comfort  Recent Flowsheet Documentation  Taken 1/3/2023 1145 by Mavis Menchaca, RN  Pain Management Interventions: medication (see MAR)  Taken 1/3/2023 1136 by Mavis Menchaca, RN  Pain Management Interventions: medication (see MAR)       Patient is A/Ox4, elevated temperature at 102.5, tylenol used to manage and MD notified. Sepsis protocol run, d/t elevated WBC and vitals. SBA with walker and gait belt when ambulating. Voiding adequately. Full sensation per Pt. Double lumen PICC infusing, blood return noted with both. No new skin issues noted. Patient rating pain 7/10 during shift, cold therapy, essential oils, massage, pain medications, rest, and repositioning used to manage pain.

## 2023-01-03 NOTE — ED PROVIDER NOTES
EMERGENCY DEPARTMENT ENCOUNTER      NAME: Mey Dixon  AGE: 37 year old female  YOB: 1985  MRN: [id number]  EVALUATION DATE & TIME: 1/2/2023  6:35 PM    PCP: Nidia Fuentes    ED PROVIDER: Raleigh Mathews MD        Chief Complaint   Patient presents with     Flu Symptoms         FINAL IMPRESSION:  1. Pyelonephritis    2. Sepsis, due to unspecified organism, unspecified whether acute organ dysfunction present (H)          ED COURSE & MEDICAL DECISION MAKING:    Pertinent Labs & Imaging studies reviewed. (See chart for details)  37 year old female presents to the Emergency Department for evaluation of fever, vomiting, headache, ear pain, congestion, cough cough    Well-appearing    Recently seen for URI-like symptoms started doxycycline at urgency room.    ED Course as of 01/02/23 2106 Mon Jan 02, 2023 2105 Chest x-ray negative for pneumonia   2105 WBC(!): 14.6  Concern for sepsis   2105 Creatinine(!): 2.01   2105 Urea Nitrogen(!): 35  Worsening GFR from February at [place]   2105 GFR Estimate(!): 32   2105 SARS CoV2 PCR: Negative   2105 Resp Syncytial Virus: Negative   2105 Influenza A: Negative   2105 HCG Qualitative Serum: Negative   2105 CT shows changes suggestive of pyelonephritis.  Does have stones with no obstructing stones   2105 Lactic acid delayed secondary to lack of access.   2105 I met with patient initially out in lobby secondary to critical capacity no available ER beds   Seen by provider in triage prior to me seeing patient was noted she was febrile and tachycardic.  Was given Toradol, Tylenol, IV fluids, Zofran with improvement in heart rate and fever    I met with patient and found out she is got a transplanted kidney and is immunosuppressed.  Urine is highly suggestive of UTI patient does have dysuria    On exam no evidence of mastoiditis.  Will obtain CT head which was negative for abscess.  Bacterial meningitis highly unlikely.  CT abdomen shows changes just for  pyelonephritis    Spoke with renal transplant team [facility] who recommends broad-spectrum antibiotics, nephrology consult and tacrolimus levels tomorrow    They recommend continue with immunosuppression    Given IV fluids    Unfortunately work-up and lactic acid was delayed and antibiotics was delayed due to lack of access despite nurses trying x2.  PICC nurse was consulted for midline    The resident service agrees to admit patient      Medical Decision Making    History:    Supplemental history from: Documented in HPI, if applicable    External Record(s) reviewed: Documented in HPI, if applicable.    Work Up:    Chart documentation includes differential considered and any EKGs or imaging independently interpreted by provider.    In additional to work up documented, I considered the following work up: See chart documentation, if applicable.    External consultation:    Discussion of management with another provider: See chart documentation, if applicable    Complicating factors:    Care impacted by chronic illness: Chronic Kidney Disease    Care affected by social determinants of health: N/A    Disposition considerations: Admit.    6:20 PM I introduced myself to the patient, obtained patient history, performed a physical exam, and discussed plan for ED workup including potential diagnostic laboratory/imaging studies and interventions.    7:17 PM Spoke with [facility] [place] [facility] who recommended broad-spectrum antibiotics.    7:36 PM Per nurse, they are having a hard time getting a line in her. PICC will be called.    8:54 PM Spoke with hospitalist, Dr. Cruz for admission.    At the conclusion of the encounter I discussed the results of all of the tests and the disposition. The questions were answered. The patient or family acknowledged understanding and was agreeable with the care plan.         MEDICATIONS GIVEN IN THE EMERGENCY:  Medications   0.9% sodium chloride BOLUS (has no administration in  "time range)   0.9% sodium chloride BOLUS (has no administration in time range)   ceFEPIme (MAXIPIME) 2 g vial to attach to  ml bag for ADULTS or 50 ml bag for PEDS (has no administration in time range)   vancomycin (VANCOCIN) 2,000 mg in 0.9% NaCl 500 mL intermittent infusion (has no administration in time range)   lidocaine 1 % 0.1-5 mL (2 mLs Other Given 1/2/23 2038)   lidocaine (LMX4) cream (has no administration in time range)   sodium chloride (PF) 0.9% PF flush 10-40 mL (has no administration in time range)   acetaminophen (TYLENOL) tablet 650 mg (650 mg Oral Given 1/2/23 1527)   0.9% sodium chloride BOLUS (0 mLs Intravenous Stopped 1/2/23 1738)   ketorolac (TORADOL) injection 15 mg (15 mg Intravenous Given 1/2/23 1613)   ondansetron (ZOFRAN) injection 4 mg (4 mg Intravenous Given 1/2/23 1612)   0.9% sodium chloride BOLUS (0 mLs Intravenous Stopped 1/2/23 1932)       NEW PRESCRIPTIONS STARTED AT TODAY'S ER VISIT  New Prescriptions    No medications on file          =================================================================    HPI    Triage note  \"  Pt reports onset of flu-like symptoms on Thursday. Started with headache and has developed congestion, cough, fever, nausea, body aches. Last dose tylenol at 0830 approximately. Pt is anxious in triage.    Was seen in urgency room last night. Tested negative for covid and flu. Diagnosed with sinus infection and discharged with antibiotics.     Triage Assessment     Row Name 01/02/23 1523       Triage Assessment (Adult)    Airway WDL WDL       Respiratory WDL    Respiratory WDL X;rhythm/pattern;cough    Rhythm/Pattern, Respiratory shortness of breath    Cough Frequency infrequent       Skin Circulation/Temperature WDL    Skin Circulation/Temperature WDL WDL       Cardiac WDL    Cardiac WDL X;rhythm    Pulse Rate & Regularity tachycardic       Peripheral/Neurovascular WDL    Peripheral Neurovascular WDL WDL       Cognitive/Neuro/Behavioral WDL    " "Cognitive/Neuro/Behavioral WDL X    Level of Consciousness alert    Arousal Level opens eyes spontaneously    Mood/Behavior anxious              \"      Patient information was obtained from: patient    Use of : N/A       Mey Dixon is a 37 year old female with a pertinent history of lupus, right kidney transplant, and migraines who presents to this ED by walk in for evaluation of headache and ear congestion.    Per patient, on 12/29/22, she developed a migraine. The next day on 12/30, she developed body aches. On 12/31, she experienced nausea, extreme ear pressure, and nasal congestion. She was seen at the Urgency Room, where they prescribed her 100 mg of doxycycline for a sinus infection. Her COVID and flu were negative. She states the pills did help a bit, but her nose is still congested.    She reports bilateral ear congestion which has caused a bad headache. She endorses a fever.    Patient had diarrhea yesterday. She denies cough.    Patient reports that when she gave her urinalysis sample in the ED, she thought it was odd that her urine was a \"dark purple\" color. Patient reports urinary frequency despite not drinking many fluids and burning with urination.    Patient did have a UTI a couple of months ago.    She notes that three years from yesterday, she had a right kidney transplant due to lupus at [facility]. She missed her anti-rejection medications this morning due to her nausea. She did take them yesterday, however. Patient follows with nephrologist Veronica Marrero. Her transplant team is at [facility].    REVIEW OF SYSTEMS   Review of Systems   Constitutional: Positive for fever.   HENT: Positive for congestion (nasal, ear), ear pain and sinus pressure.    Respiratory: Negative for cough.    Gastrointestinal: Positive for diarrhea and nausea. Negative for vomiting.   Genitourinary: Positive for dysuria and frequency.        Positive for \"dark purple\" urine discoloration   Musculoskeletal: " "Positive for myalgias.   Neurological: Positive for headaches.      PAST MEDICAL HISTORY:  History reviewed. No pertinent past medical history.    PAST SURGICAL HISTORY:  History reviewed. No pertinent surgical history.      CURRENT MEDICATIONS:    CALCIUM ANTACID 500 MG chewable tablet  diphenhydrAMINE-acetaminophen (TYLENOL PM)  MG tablet  doxycycline hyclate (VIBRA-TABS) 100 MG tablet  ondansetron (ZOFRAN ODT) 4 MG ODT tab  pantoprazole (PROTONIX) 40 MG EC tablet  rosuvastatin (CRESTOR) 20 MG tablet  sodium bicarbonate 650 MG tablet  traZODone (DESYREL) 50 MG tablet  TRI-SPRINTEC 0.18/0.215/0.25 MG-35 MCG tablet  VITAMIN D3 25 MCG (1000 UT) tablet  sulfamethoxazole-trimethoprim (BACTRIM) 400-80 MG tablet        ALLERGIES:  Allergies   Allergen Reactions     Cellcept [Mycophenolate] Diarrhea and GI Disturbance       FAMILY HISTORY:  History reviewed. No pertinent family history.    SOCIAL HISTORY:   Social History     Socioeconomic History     Marital status: Single       VITALS:  /55   Pulse 94   Temp 99.5  F (37.5  C)   Resp 18   Ht 1.638 m (5' 4.5\")   Wt 95.3 kg (210 lb)   SpO2 97%   BMI 35.49 kg/m      PHYSICAL EXAM      Vitals: /55   Pulse 94   Temp 99.5  F (37.5  C)   Resp 18   Ht 1.638 m (5' 4.5\")   Wt 95.3 kg (210 lb)   SpO2 97%   BMI 35.49 kg/m    General: Appears in no acute distress, awake, alert, interactive.  Eyes: Conjunctivae non-injected. Sclera anicteric.  HENT: Atraumatic.  No mastoid tenderness.  TMs dull.  No stridor no drooling  Neck: Supple. No mastoid tenderness.  Respiratory/Chest: Respiration unlabored.  Heart: Tachycardic  Abdomen: non distended. Soft, non tender.  Genitourinary: No CVA tenderness.  Musculoskeletal: Normal extremities. No edema or erythema.  Skin: Normal color. No rash or diaphoresis.  Neurologic: Face symmetric, moves all extremities spontaneously. Speech clear.  Psychiatric: Oriented to person, place, and time. Affect appropriate.     "   LAB:  All pertinent labs reviewed and interpreted.  Results for orders placed or performed during the hospital encounter of 01/02/23   Chest XR,  PA & LAT    Impression    IMPRESSION: Negative chest.     CT Abdomen Pelvis w/o Contrast    Impression    IMPRESSION:   1.  No acute abnormality.  2.  Nephrolithiasis of the transplant kidney.  3.  Hepatic steatosis.  4.  Avascular necrosis of the femoral heads.     Head CT w/o contrast    Impression    IMPRESSION:  1.  No CT finding of a mass, hemorrhage or focal area suggestive of acute infarct.   Symptomatic Influenza A/B & SARS-CoV2 (COVID-19) Virus PCR Multiplex Nose    Specimen: Nose; Swab   Result Value Ref Range    Influenza A PCR Negative Negative    Influenza B PCR Negative Negative    RSV PCR Negative Negative    SARS CoV2 PCR Negative Negative   Basic metabolic panel   Result Value Ref Range    Sodium 134 (L) 136 - 145 mmol/L    Potassium 4.4 3.5 - 5.0 mmol/L    Chloride 103 98 - 107 mmol/L    Carbon Dioxide (CO2) 20 (L) 22 - 31 mmol/L    Anion Gap 11 5 - 18 mmol/L    Urea Nitrogen 35 (H) 8 - 22 mg/dL    Creatinine 2.01 (H) 0.60 - 1.10 mg/dL    Calcium 10.1 8.5 - 10.5 mg/dL    Glucose 116 70 - 125 mg/dL    GFR Estimate 32 (L) >60 mL/min/1.73m2   hCG Qualitative Pregnancy   Result Value Ref Range    hCG Serum Qualitative Negative Negative   CBC with platelets and differential   Result Value Ref Range    WBC Count 14.6 (H) 4.0 - 11.0 10e3/uL    RBC Count 3.97 3.80 - 5.20 10e6/uL    Hemoglobin 11.1 (L) 11.7 - 15.7 g/dL    Hematocrit 35.3 35.0 - 47.0 %    MCV 89 78 - 100 fL    MCH 28.0 26.5 - 33.0 pg    MCHC 31.4 (L) 31.5 - 36.5 g/dL    RDW 14.7 10.0 - 15.0 %    Platelet Count 202 150 - 450 10e3/uL   Manual Differential   Result Value Ref Range    % Neutrophils 90 %    % Lymphocytes 2 %    % Monocytes 8 %    % Eosinophils 0 %    % Basophils 0 %    Absolute Neutrophils 13.1 (H) 1.6 - 8.3 10e3/uL    Absolute Lymphocytes 0.3 (L) 0.8 - 5.3 10e3/uL    Absolute  Monocytes 1.2 0.0 - 1.3 10e3/uL    Absolute Eosinophils 0.0 0.0 - 0.7 10e3/uL    Absolute Basophils 0.0 0.0 - 0.2 10e3/uL    RBC Morphology Confirmed RBC Indices     Platelet Assessment  Automated Count Confirmed. Platelet morphology is normal.     Automated Count Confirmed. Platelet morphology is normal.   UA with Microscopic reflex to Culture    Specimen: Urine, Clean Catch   Result Value Ref Range    Color Urine Yellow Colorless, Straw, Light Yellow, Yellow    Appearance Urine Turbid (A) Clear    Glucose Urine Negative Negative mg/dL    Bilirubin Urine Negative Negative    Ketones Urine Trace (A) Negative mg/dL    Specific Gravity Urine 1.029 1.001 - 1.030    Blood Urine 0.5 mg/dL (A) Negative    pH Urine 6.0 5.0 - 7.0    Protein Albumin Urine 300 (A) Negative mg/dL    Urobilinogen Urine 2.0 (A) <2.0 mg/dL    Nitrite Urine Negative Negative    Leukocyte Esterase Urine 25 Devonte/uL (A) Negative    Bacteria Urine Moderate (A) None Seen /HPF    Mucus Urine Present (A) None Seen /LPF    RBC Urine 15 (H) <=2 /HPF    WBC Urine 18 (H) <=5 /HPF    Squamous Epithelials Urine 2 (H) <=1 /HPF    Transitional Epithelials Urine <1 <=1 /HPF       RADIOLOGY:  Reviewed all pertinent imaging. Please see official radiology report.  CT Abdomen Pelvis w/o Contrast   Final Result   IMPRESSION:    1.  No acute abnormality.   2.  Nephrolithiasis of the transplant kidney.   3.  Hepatic steatosis.   4.  Avascular necrosis of the femoral heads.         Head CT w/o contrast   Final Result   IMPRESSION:   1.  No CT finding of a mass, hemorrhage or focal area suggestive of acute infarct.      Chest XR,  PA & LAT   Final Result   IMPRESSION: Negative chest.               I, Ritu Medina, am serving as a scribe to document services personally performed by El Mathews MD based on my observation and the provider's statements to me. I, Dr. El Mathews, attest that Ritu Medina is acting in a scribe capacity, has observed my  performance of the services and has documented them in accordance with my direction.    El Mathews MD  Emergency Medicine  [facility] EMERGENCY ROOM  [address] [id number]  [phone]     El Mathews MD  01/02/23 6160

## 2023-01-03 NOTE — PROCEDURES
"  PICC Line Insertion Procedure Note  Pt. Name: Mey Dixon  MRN:        [id number]    Procedure: Insertion of a  Double Lumen  5 fr  Bard SOLO (valved) Power PICC, Lot number [id number]    Indications: Sepsis / Antibiotics    Contraindications : none    Procedure Details     Patient identified with 2 identifiers and \"Time Out\" conducted.  .     Central line insertion bundle followed: hand hygiene performed prior to procedure, site cleansed with cholraprep, hat, mask, sterile gloves, sterile gown worn, patient draped with maximum barrier head to toe drape, sterile field maintained.    The vein was assessed and found to be compressible and of adequate size.     Lidocaine 1% 2 ml administered sq to the insertion site. A 5 Fr PICC was inserted into the brachial (medial) vein of the right arm with ultrasound guidance. 1 attempt(s) required to access vein.   Catheter threaded without difficulty. Good blood return noted.    Modified Seldinger Technique used for insertion.    The 8 sharps that are included in the PICC insertion kit were accounted for and disposed of in the sharps container prior to breakdown of the sterile field.    Catheter secured with Statlock, biopatch and Tegaderm dressing applied.    Findings:    Total catheter length  40 cm, with 3 cm exposed. Mid upper arm circumference is 37 cm. Catheter was flushed with 20 cc NS. Patient  tolerated procedure well.    Tip placement verified by 3CG technology and blood return.      CLABSI prevention brochure left at bedside.    Patient's primary RN notified PICC is ready for use.      Comments:        Feliz Enriquez, RN, BSN  Vascular Access - [facility]        "

## 2023-01-03 NOTE — H&P
[facility]    History and Physical - Hospitalist Service       Date of Admission:  1/2/2023    Assessment & Plan      Mey Dixon is a 37 year old old female with a past medical history of lupus s/p right renal transplant (2019), who presented to the [facility] Emergency Department on the day of admission with complaints of chills, fevers, body aches since last Thursday.      Sepsis  Right pyelonephritis  S/p R Renal Transplant   Febrile, tachycardic, and hypertensive.  Received Toradol and 2 L of NS down in ED.  CT abdomen pelvis with trace transplant kidney perinephric stranding    Nephrology consult    Follow urine and blood cultures    Continue cefepime (renal dose) and vancomycin (1/2 - )    Scheduled Tylenol, hold while asleep    Telemetry    Zofran and Compazine as needed    Pain control with gabapentin, oxycodone as needed    PTA immunosuppressants once able to confirm dosing    Tacrolimus trough level    Maintenance IV fluids     AM BMP and CBC    Evening BMP    Renal diet    PTA sodium bicarb, Bactrim prophylaxis    Tachycardia  Likely related to fever as above    EKG    Holosystolic murmur  Previously noted to have murmur related to her anemia.  Intermittently present.    Monitor, if blood cultures positive then consider echo    Lupus  Reportedly on prednisone, azathioprine, and tacrolimus    PharmD confirming doses in AM. Pt skipped 1/2/23 dose due to nausea, vomiting.      Avascular necrosis of femoral heads  Stable and mild, asymptomatic    Monitor     Chronic Conditions  GERD: Continue PTA Protonix  Hyperlipidemia: Continue PTA rosuvastatin  Depression: Continue PTA trazodone          Fluids: Normal Saline  Diet: Renal Diet (non-dialysis)  PPX: Sequential Compression Boots  Morel Catheter: Not present  Lines:   PICC 01/02/23 Double Lumen Right Brachial vein medial-Site Assessment: WDL      Cardiac Monitoring: None  Code Status: Full Code    Clinically Significant Risk  "Factors Present on Admission                       # Obesity: Estimated body mass index is 35.49 kg/m  as calculated from the following:    Height as of this encounter: 1.638 m (5' 4.5\").    Weight as of this encounter: 95.3 kg (210 lb).             Disposition Plan   Status: Inpatient  Expected Discharge:    Expected Discharge Date: 01/04/2023           Anticipated discharge location: TBD       Patient was staffed with supervising physician, Dr. Enrique Garrett, who agrees with the findings and plan. Patient to be seen in the morning by attending, Dr. Mauricio Peters, DO  Hospitalist Service  [facility]  Please page/text page the senior pager with any questions or concerns, 24/7: [phone], or search ID# [id number] on [employer]  Securely message with [employer] (more info)  Text page via [employer] Paging/Directory     Chief Complaint   Likely congestion    History of Present Illness   Mey Dixon is a 37 year old old female with a past medical history of lupus s/p right renal transplant (2019), who presented to the [facility] Emergency Department on the day of admission with complaints of chills, fevers, body aches since last Thursday.      Flulike symptoms starting last Thursday beginning with a headache.  Tested negative for flu and COVID a few times at home.  Went to an urgent care last night, was diagnosed with sinusitis and given doxycycline that helps a little bit but was short-lived, negative COVID test at that time.    For past day, has had worsening nausea with vomiting and dysuria.  Did not take any of her normal medications on day of admit.    History is obtained from the patient    ED COURSE:  ED Triage Vitals [01/02/23 1521]   Enc Vitals Group      /77      Pulse (!) 145      Resp 26      Temp (!) 103.2  F (39.6  C)      Temp src Oral      SpO2 98 %      Weight 95.3 kg (210 lb)      Height 1.638 m (5' 4.5\")      Head Circumference       Peak Flow       Pain Score       " Pain Loc       Pain Edu?       Excl. in GC?      Initial evaluation in the Emergency Department: Febrile and tachycardic, complaining of ear pain and chest congestion in triage.  Prior to the ED provider seeing patient, patient received Tylenol, Toradol injection, and Zofran.  Work-up with [facility] on CKD, elevated white count, UA showing signs of infection.  CT abdomen showing avascular necrosis of femoral head that is stable and nonobstructing stone of transplant kidney with trace perinephric stranding.  ED provider contacted patient's transplant team who recommends broad-spectrum antibiotics, nephrology consult, tacrolimus trough levels.  ED staff unable to place peripheral IV, PICC line added and blood cultures x2 pending with cefepime and vancomycin started.    Patient was admitted to the Med Surg for further workup and management.     Review of Systems   Review of Systems  A 12 point comprehensive review of systems was negative except as noted above or in HPI    Past Medical History    I have reviewed this patient's medical history and updated it with pertinent information if needed.   History reviewed. No pertinent past medical history.  Past Surgical History   I have reviewed this patient's surgical history and updated it with pertinent information if needed.  Past Surgical History:   Procedure Laterality Date     PICC DOUBLE LUMEN PLACEMENT  1/2/2023          Social History   I have reviewed this patient's social history and updated it with pertinent information if needed.  Surrogate decision maker/POA: Mother and her significant other, Mika  Social History     Social History Narrative     Not on file          Family History     Prior to Admission Medications   Prior to Admission Medications   Prescriptions Last Dose Informant Patient Reported? Taking?   CALCIUM ANTACID 500 MG chewable tablet Past Week  Yes Yes   Sig: Take 3,000 mg by mouth nightly as needed   TRI-SPRINTEC 0.18/0.215/0.25 MG-35 MCG tablet  1/1/2023  Yes Yes   Sig: Take 1 tablet by mouth daily   VITAMIN D3 25 MCG (1000 UT) tablet 1/1/2023  Yes Yes   Sig: Take 25 mcg by mouth 2 times daily   azaTHIOprine 75 MG TABS 1/1/2023  Yes Yes   Sig: Take 75 mg by mouth daily   diphenhydrAMINE-acetaminophen (TYLENOL PM)  MG tablet 12/30/2022  Yes Yes   Sig: Take 2 tablets by mouth nightly as needed for sleep   doxycycline hyclate (VIBRA-TABS) 100 MG tablet 1/2/2023 at took initial dose  Yes Yes   Sig: Take 100 mg by mouth 2 times daily   ondansetron (ZOFRAN ODT) 4 MG ODT tab 1/1/2023  Yes Yes   Sig: Take 4 mg by mouth every 6 hours as needed   pantoprazole (PROTONIX) 40 MG EC tablet 12/30/2022  Yes Yes   Sig: Take 40 mg by mouth At Bedtime   predniSONE (DELTASONE) 5 MG tablet 1/1/2023  Yes Yes   Sig: Take 5 mg by mouth daily   rosuvastatin (CRESTOR) 20 MG tablet 12/30/2022  Yes Yes   Sig: Take 20 mg by mouth At Bedtime   sodium bicarbonate 650 MG tablet 1/1/2023  Yes Yes   Sig: Take 650 mg by mouth 2 times daily   sulfamethoxazole-trimethoprim (BACTRIM) 400-80 MG tablet 12/30/2022  Yes No   Sig: Take 1 tablet by mouth three times a week MWF   tacrolimus (ENVARSUS XR) 1 MG 24 hr tablet 1/1/2023  Yes Yes   Sig: Take 3 mg by mouth every morning (before breakfast)   tacrolimus (ENVARSUS XR) 4 MG 24 hr tablet 1/1/2023  Yes Yes   Sig: Take 4 mg by mouth every morning (before breakfast)   traZODone (DESYREL) 50 MG tablet 12/30/2022  Yes Yes   Sig: Take 100 mg by mouth At Bedtime daily      Facility-Administered Medications: None     Allergies   Allergies   Allergen Reactions     Cellcept [Mycophenolate] Diarrhea and GI Disturbance       Physical Exam   Temp:  [99.5  F (37.5  C)-103.2  F (39.6  C)] 102.8  F (39.3  C)  Pulse:  [] 121  Resp:  [18-26] 20  BP: ()/(55-89) 133/66  SpO2:  [97 %-98 %] 97 %+  Vital Signs: Temp: (!) 102.8  F (39.3  C) Temp src: Oral BP: 133/66 Pulse: (!) 121   Resp: 20 SpO2: 97 % O2 Device: None (Room air)    Weight: 210 lbs 0  oz    Physical Exam  GENERAL: Chills, shaking, alert, well nourished,   HENT: Head - atraumatic; Ears - external ear and ear canal without lesions or rashes; Nose- moist mucosa, no lesions, septum midline; Mouth- no ulcers, no lesions, moist oral mucosa, good dentition  EYES: Eyes grossly normal to inspection, extraocular movements intact, and PERRL  NECK: no tenderness, no adenopathy, no asymmetry, no masses, no stiffness; thyroid- normal to palpation  RESP: lungs clear to auscultation - no rales, no rhonchi, no wheezes, normal work of breathing  CV: regular rates and rhythm, normal S1 S2, no S3 or S4, and 2/6 holosystolic murmur, no click or rub, appears well perfused, cap refill <2s  ABDOMEN: soft, no tenderness, no hepatosplenomegaly, no masses, normal bowel sounds  MSK: Moves all extremities spontaneously, no muscular deficits  EXTREMITIES: Distal pulses present, no peripheral edema  SKIN: no suspicious lesions, no rashes  NEURO: strength and tone normal, sensory exam grossly normal, mentation intact, speech normal  BACK: no CVA tenderness, no paralumbar tenderness      Data   Pertinent Labs  Lab Results: personally reviewed.   Recent Labs   Lab 01/02/23  1607   WBC 14.6*   HGB 11.1*   MCV 89      *   POTASSIUM 4.4   CHLORIDE 103   CO2 20*   BUN 35*   CR 2.01*   ANIONGAP 11   JUNI 10.1        Most Recent 3 CBC's:  Recent Labs   Lab Test 01/02/23  1607   WBC 14.6*   HGB 11.1*   MCV 89        Most Recent 3 BMP's:  Recent Labs   Lab Test 01/02/23  1607   *   POTASSIUM 4.4   CHLORIDE 103   CO2 20*   BUN 35*   CR 2.01*   ANIONGAP 11   JUNI 10.1          Pertinent Radiology:  Radiology Results: personally reviewed.   Recent Results (from the past 24 hour(s))   Chest XR,  PA & LAT    Narrative    EXAM: XR CHEST 2 VIEWS  LOCATION: [facility]  DATE/TIME: 01/02/2023, 4:31 PM    INDICATION: Cough, fever.  COMPARISON: Chest x-ray 09/30/2022.      Impression     IMPRESSION: Negative chest.     Head CT w/o contrast    Narrative    EXAM: CT HEAD W/O CONTRAST  LOCATION: [facility] [place]  DATE/TIME: 1/2/2023 7:02 PM    INDICATION: fever, headache, immusupprsed  COMPARISON: 09/30/2022.  TECHNIQUE: Routine CT Head without IV contrast. Multiplanar reformats. Dose reduction techniques were used.    FINDINGS:  INTRACRANIAL CONTENTS: No intracranial hemorrhage, extraaxial collection, or mass effect.  No CT evidence of acute infarct. Normal parenchymal attenuation. Normal ventricles and sulci.     VISUALIZED ORBITS/SINUSES/MASTOIDS: No intraorbital abnormality. No paranasal sinus mucosal disease. No middle ear or mastoid effusion.    BONES/SOFT TISSUES: No acute abnormality.      Impression    IMPRESSION:  1.  No CT finding of a mass, hemorrhage or focal area suggestive of acute infarct.   CT Abdomen Pelvis w/o Contrast    Narrative    EXAM: CT ABDOMEN PELVIS W/O CONTRAST  LOCATION: [facility] [place]  DATE/TIME: 1/2/2023 7:07 PM    INDICATION: fever, UTI, transplanted kidney  COMPARISON: 9/30/2022  TECHNIQUE: CT scan of the abdomen and pelvis was performed without IV contrast. Multiplanar reformats were obtained. Dose reduction techniques were used.  CONTRAST: None.    FINDINGS:   LOWER CHEST: Normal.    HEPATOBILIARY: Hepatic steatosis.    PANCREAS: Normal.    SPLEEN: Normal.    ADRENAL GLANDS: Normal.    NATIVE KIDNEYS/BLADDER: The kidneys are atrophic. A simple cyst lower pole right kidney, no follow-up required. No hydronephrosis.    TRANSPLANT KIDNEY: A right pelvic transplant kidney. There are 3 stable adjacent stones in the upper pole with the largest measuring 0.6 cm. No hydronephrosis. Trace perinephric fat stranding.    BOWEL: No obstruction or inflammatory change. Normal appendix.    LYMPH NODES: Normal.    VASCULATURE: Unremarkable.    PELVIC ORGANS: Normal.    MUSCULOSKELETAL: Stable mild subchondral sclerosis in the femoral head is  consistent with avascular necrosis.      Impression    IMPRESSION:   1.  No acute abnormality.  2.  Nephrolithiasis of the transplant kidney.  3.  Hepatic steatosis.  4.  Avascular necrosis of the femoral heads.         Cardiographics:   EKG Results: personally reviewed.  and not yet available.  EKG: .         This note was created with help of Dragon dictation system. Grammatical /typing errors are not intentional.

## 2023-01-04 LAB
ANION GAP SERPL CALCULATED.3IONS-SCNC: 5 MMOL/L (ref 5–18)
BACTERIA UR CULT: NORMAL
BUN SERPL-MCNC: 29 MG/DL (ref 8–22)
CALCIUM SERPL-MCNC: 8 MG/DL (ref 8.5–10.5)
CHLORIDE BLD-SCNC: 113 MMOL/L (ref 98–107)
CO2 SERPL-SCNC: 18 MMOL/L (ref 22–31)
CREAT SERPL-MCNC: 1.43 MG/DL (ref 0.6–1.1)
ERYTHROCYTE [DISTWIDTH] IN BLOOD BY AUTOMATED COUNT: 15.2 % (ref 10–15)
GFR SERPL CREATININE-BSD FRML MDRD: 49 ML/MIN/1.73M2
GLUCOSE BLD-MCNC: 108 MG/DL (ref 70–125)
HCT VFR BLD AUTO: 27.3 % (ref 35–47)
HGB BLD-MCNC: 8.3 G/DL (ref 11.7–15.7)
MCH RBC QN AUTO: 28.1 PG (ref 26.5–33)
MCHC RBC AUTO-ENTMCNC: 30.4 G/DL (ref 31.5–36.5)
MCV RBC AUTO: 93 FL (ref 78–100)
PLATELET # BLD AUTO: 110 10E3/UL (ref 150–450)
POTASSIUM BLD-SCNC: 4.1 MMOL/L (ref 3.5–5)
RBC # BLD AUTO: 2.95 10E6/UL (ref 3.8–5.2)
SODIUM SERPL-SCNC: 136 MMOL/L (ref 136–145)
VANCOMYCIN SERPL-MCNC: 18.2 MG/L
WBC # BLD AUTO: 9.4 10E3/UL (ref 4–11)

## 2023-01-04 PROCEDURE — 250N000011 HC RX IP 250 OP 636: Performed by: STUDENT IN AN ORGANIZED HEALTH CARE EDUCATION/TRAINING PROGRAM

## 2023-01-04 PROCEDURE — 250N000013 HC RX MED GY IP 250 OP 250 PS 637: Performed by: STUDENT IN AN ORGANIZED HEALTH CARE EDUCATION/TRAINING PROGRAM

## 2023-01-04 PROCEDURE — 99233 SBSQ HOSP IP/OBS HIGH 50: CPT | Mod: GC

## 2023-01-04 PROCEDURE — 250N000013 HC RX MED GY IP 250 OP 250 PS 637: Performed by: NURSE PRACTITIONER

## 2023-01-04 PROCEDURE — 250N000009 HC RX 250: Performed by: NURSE PRACTITIONER

## 2023-01-04 PROCEDURE — 250N000012 HC RX MED GY IP 250 OP 636 PS 637

## 2023-01-04 PROCEDURE — 80202 ASSAY OF VANCOMYCIN: CPT | Performed by: STUDENT IN AN ORGANIZED HEALTH CARE EDUCATION/TRAINING PROGRAM

## 2023-01-04 PROCEDURE — 250N000013 HC RX MED GY IP 250 OP 250 PS 637

## 2023-01-04 PROCEDURE — 99223 1ST HOSP IP/OBS HIGH 75: CPT | Performed by: NURSE PRACTITIONER

## 2023-01-04 PROCEDURE — 99232 SBSQ HOSP IP/OBS MODERATE 35: CPT | Performed by: PHYSICIAN ASSISTANT

## 2023-01-04 PROCEDURE — 120N000001 HC R&B MED SURG/OB

## 2023-01-04 PROCEDURE — 85027 COMPLETE CBC AUTOMATED: CPT

## 2023-01-04 PROCEDURE — 80048 BASIC METABOLIC PNL TOTAL CA: CPT

## 2023-01-04 PROCEDURE — 258N000003 HC RX IP 258 OP 636: Performed by: STUDENT IN AN ORGANIZED HEALTH CARE EDUCATION/TRAINING PROGRAM

## 2023-01-04 PROCEDURE — 258N000003 HC RX IP 258 OP 636: Performed by: PHYSICIAN ASSISTANT

## 2023-01-04 RX ORDER — RIZATRIPTAN BENZOATE 5 MG/1
5 TABLET, ORALLY DISINTEGRATING ORAL
Status: DISCONTINUED | OUTPATIENT
Start: 2023-01-04 | End: 2023-01-04

## 2023-01-04 RX ORDER — HYDROMORPHONE HYDROCHLORIDE 1 MG/ML
0.5 INJECTION, SOLUTION INTRAMUSCULAR; INTRAVENOUS; SUBCUTANEOUS ONCE
Status: COMPLETED | OUTPATIENT
Start: 2023-01-04 | End: 2023-01-04

## 2023-01-04 RX ORDER — AMOXICILLIN 250 MG
1 CAPSULE ORAL 2 TIMES DAILY PRN
Status: DISCONTINUED | OUTPATIENT
Start: 2023-01-04 | End: 2023-01-06 | Stop reason: HOSPADM

## 2023-01-04 RX ORDER — BUTALBITAL, ACETAMINOPHEN AND CAFFEINE 50; 325; 40 MG/1; MG/1; MG/1
1 TABLET ORAL EVERY 8 HOURS PRN
Status: DISCONTINUED | OUTPATIENT
Start: 2023-01-04 | End: 2023-01-06 | Stop reason: HOSPADM

## 2023-01-04 RX ORDER — GABAPENTIN 300 MG/1
300 CAPSULE ORAL DAILY PRN
Status: DISCONTINUED | OUTPATIENT
Start: 2023-01-04 | End: 2023-01-06 | Stop reason: HOSPADM

## 2023-01-04 RX ORDER — BISACODYL 10 MG
10 SUPPOSITORY, RECTAL RECTAL DAILY PRN
Status: DISCONTINUED | OUTPATIENT
Start: 2023-01-04 | End: 2023-01-06 | Stop reason: HOSPADM

## 2023-01-04 RX ORDER — OXYCODONE HYDROCHLORIDE 5 MG/1
5 TABLET ORAL EVERY 6 HOURS PRN
Status: DISCONTINUED | OUTPATIENT
Start: 2023-01-04 | End: 2023-01-06 | Stop reason: HOSPADM

## 2023-01-04 RX ORDER — RIZATRIPTAN BENZOATE 5 MG/1
5 TABLET, ORALLY DISINTEGRATING ORAL
Status: COMPLETED | OUTPATIENT
Start: 2023-01-04 | End: 2023-01-04

## 2023-01-04 RX ORDER — LIDOCAINE 50 MG/G
OINTMENT TOPICAL 4 TIMES DAILY
Status: DISCONTINUED | OUTPATIENT
Start: 2023-01-04 | End: 2023-01-06 | Stop reason: HOSPADM

## 2023-01-04 RX ORDER — TIZANIDINE 2 MG/1
2 TABLET ORAL EVERY 8 HOURS PRN
Status: DISCONTINUED | OUTPATIENT
Start: 2023-01-04 | End: 2023-01-04

## 2023-01-04 RX ORDER — LIDOCAINE 4 G/G
1 PATCH TOPICAL
Status: DISCONTINUED | OUTPATIENT
Start: 2023-01-04 | End: 2023-01-04

## 2023-01-04 RX ADMIN — CHOLECALCIFEROL TAB 25 MCG (1000 UNIT) 25 MCG: 25 TAB at 08:00

## 2023-01-04 RX ADMIN — HYDROMORPHONE HYDROCHLORIDE 0.5 MG: 1 INJECTION, SOLUTION INTRAMUSCULAR; INTRAVENOUS; SUBCUTANEOUS at 11:25

## 2023-01-04 RX ADMIN — PREDNISONE 5 MG: 5 TABLET ORAL at 08:00

## 2023-01-04 RX ADMIN — ACETAMINOPHEN 650 MG: 325 TABLET ORAL at 23:35

## 2023-01-04 RX ADMIN — ACETAMINOPHEN 650 MG: 325 TABLET ORAL at 17:08

## 2023-01-04 RX ADMIN — PANTOPRAZOLE SODIUM 40 MG: 20 TABLET, DELAYED RELEASE ORAL at 20:30

## 2023-01-04 RX ADMIN — TRAZODONE HYDROCHLORIDE 100 MG: 100 TABLET ORAL at 20:30

## 2023-01-04 RX ADMIN — ONDANSETRON 4 MG: 2 INJECTION INTRAMUSCULAR; INTRAVENOUS at 15:03

## 2023-01-04 RX ADMIN — BUTALBITAL, ACETAMINOPHEN, AND CAFFEINE 1 TABLET: 50; 325; 40 TABLET ORAL at 17:15

## 2023-01-04 RX ADMIN — SULFAMETHOXAZOLE AND TRIMETHOPRIM 1 TABLET: 400; 80 TABLET ORAL at 08:00

## 2023-01-04 RX ADMIN — ACETAMINOPHEN 650 MG: 325 TABLET ORAL at 04:55

## 2023-01-04 RX ADMIN — CEFEPIME 2 G: 2 INJECTION, POWDER, FOR SOLUTION INTRAVENOUS at 20:22

## 2023-01-04 RX ADMIN — LIDOCAINE: 50 OINTMENT TOPICAL at 20:29

## 2023-01-04 RX ADMIN — ACETAMINOPHEN 650 MG: 325 TABLET ORAL at 11:10

## 2023-01-04 RX ADMIN — TIZANIDINE 2 MG: 2 TABLET ORAL at 01:42

## 2023-01-04 RX ADMIN — CHOLECALCIFEROL TAB 25 MCG (1000 UNIT) 25 MCG: 25 TAB at 20:30

## 2023-01-04 RX ADMIN — VANCOMYCIN HYDROCHLORIDE 1250 MG: 5 INJECTION, POWDER, LYOPHILIZED, FOR SOLUTION INTRAVENOUS at 21:09

## 2023-01-04 RX ADMIN — DICLOFENAC SODIUM 2 G: 10 GEL TOPICAL at 17:09

## 2023-01-04 RX ADMIN — LIDOCAINE 1 PATCH: 246 PATCH TOPICAL at 01:42

## 2023-01-04 RX ADMIN — ROSUVASTATIN CALCIUM 20 MG: 10 TABLET, FILM COATED ORAL at 20:30

## 2023-01-04 RX ADMIN — CEFEPIME 2 G: 2 INJECTION, POWDER, FOR SOLUTION INTRAVENOUS at 08:00

## 2023-01-04 RX ADMIN — SODIUM BICARBONATE 650 MG TABLET 650 MG: at 08:00

## 2023-01-04 RX ADMIN — OXYCODONE HYDROCHLORIDE 5 MG: 5 TABLET ORAL at 07:54

## 2023-01-04 RX ADMIN — SODIUM CHLORIDE: 9 INJECTION, SOLUTION INTRAVENOUS at 17:15

## 2023-01-04 RX ADMIN — RIZATRIPTAN BENZOATE 5 MG: 5 TABLET, ORALLY DISINTEGRATING ORAL at 01:54

## 2023-01-04 RX ADMIN — OXYCODONE HYDROCHLORIDE 5 MG: 5 TABLET ORAL at 20:32

## 2023-01-04 RX ADMIN — Medication 75 MG: at 08:00

## 2023-01-04 RX ADMIN — SODIUM BICARBONATE 650 MG TABLET 650 MG: at 20:30

## 2023-01-04 ASSESSMENT — ACTIVITIES OF DAILY LIVING (ADL)
HEARING_DIFFICULTY_OR_DEAF: NO
CONCENTRATING,_REMEMBERING_OR_MAKING_DECISIONS_DIFFICULTY: NO
ADLS_ACUITY_SCORE: 20
ADLS_ACUITY_SCORE: 38
DIFFICULTY_EATING/SWALLOWING: NO
ADLS_ACUITY_SCORE: 38
DEPENDENT_IADLS:: INDEPENDENT
ADLS_ACUITY_SCORE: 38
DOING_ERRANDS_INDEPENDENTLY_DIFFICULTY: NO
WALKING_OR_CLIMBING_STAIRS_DIFFICULTY: NO
ADLS_ACUITY_SCORE: 20
FALL_HISTORY_WITHIN_LAST_SIX_MONTHS: NO
DIFFICULTY_COMMUNICATING: NO
ADLS_ACUITY_SCORE: 20
DRESSING/BATHING_DIFFICULTY: NO
WEAR_GLASSES_OR_BLIND: NO
ADLS_ACUITY_SCORE: 38
ADLS_ACUITY_SCORE: 38
TOILETING_ISSUES: NO
ADLS_ACUITY_SCORE: 37

## 2023-01-04 NOTE — PROGRESS NOTES
[facility]    Progress Note - Hospitalist Service       Date of Admission:  1/2/2023    Assessment & Plan      Mey Dixon is a 37 year old old female with a past medical history of lupus s/p right renal transplant (2019), who was admitted for sepsis due to pyelonephritis. Patient improving, nephrology following.     Sepsis, resolved  Right pyelonephritis, improving  S/p R Renal Transplant   Vital stabilized today 1/4, patient still hypertensive but no longer febrile.  Multimodal pain control with pain team consulted and following.   Condition mildly improved today, nephrology recommending decreasing IV fluids in the setting of decreased N/V.  We will continue IV fluids and provide supportive cares, and continue immunosuppressants.    Nephrology consult, appreciate recommendations    Recommend supportive cares with ongoing IVF, plan for expectant management    Continue immunosuppression    Draw tacrolimus level 1/5    Continue oral bicarbonate    Follow urine and blood cultures, NGTD and urine culture showing mixed urogenital catrachita    Continue cefepime (renal dose) and vancomycin (1/2 - )    Pain team consulted, appreciate recommendations    Scheduled Tylenol, hold while asleep    Zofran and Compazine as needed    AM BMP and CBC    Renal diet    PTA Bactrim prophylaxis     Intractable migraine  Ear pain, bilateral  Frontal sinus pain  Believe her headache and ear pain and sinus pain are all 2/2 intractable migraine.  She usually gets Botox injections, has not had relief in the past with triptans or other migraine medications.  Only minor relief with lidocaine patch Tylenol and rizatriptan, given one-time dose of Dilaudid as this is helped in the past.  Pain team consulted for this, appreciate their assistance.  We will continue Flonase as she has not complained of congestion today.  - Pain management consult placed  - Flonase every day     Holosystolic murmur  Previously noted to  "have murmur related to her anemia.  Intermittently present.    Monitor, if blood cultures positive then consider echo.  Cultures currently NGTD.     Lupus  Reportedly on prednisone, azathioprine, and tacrolimus    PharmD confirming doses in AM. Pt skipped 1/2/23 dose due to nausea, vomiting.      Tachycardia, resolved  Likely related to fever as above. EKG shows sinus tachycardia.  Patient no longer tachycardic and has been taken off cardiac telemetry.    Avascular necrosis of femoral heads  Stable and mild, asymptomatic    Monitor        Chronic Conditions  GERD: Continue PTA Protonix  Hyperlipidemia: Continue PTA rosuvastatin  Depression: Continue PTA trazodone         Diet: Renal Diet (non-dialysis)    DVT Prophylaxis: Pneumatic Compression Devices  Morel Catheter: Not present  Fluids: 0.9% NS at 125 mL/HR  Lines: PRESENT      PICC 01/02/23 Double Lumen Right Brachial vein medial-Site Assessment: Ecchymotic;WDL except      Cardiac Monitoring: ACTIVE order. Indication: sepsis  Code Status: Full Code      Clinically Significant Risk Factors                # Thrombocytopenia: Lowest platelets = 110 in last 2 days, will monitor for bleeding          # Obesity: Estimated body mass index is 37.53 kg/m  as calculated from the following:    Height as of this encounter: 1.638 m (5' 4.5\").    Weight as of this encounter: 100.7 kg (222 lb 1.6 oz)., PRESENT ON ADMISSION         Disposition Plan      Expected Discharge Date: 01/06/2023      Destination: home  Discharge Comments: Monitor Kidneys        The patient's care was discussed with the Attending Physician, Dr. Mable Gallo.    Christopher Weeks DO  Hospitalist Service  [facility]  Securely message with [employer] (more info)  Text page via [employer] Paging/Directory   ______________________________________________________________________    Interval History   Patient was seen in bed she was laying comfortably.  She states that she feels relatively the " same although her back pain is mildly improved, intermittent.  Feels less febrile after interventions early this morning.  States she does not have much of an appetite.  Denies abdominal pain, vomiting.  Nausea improved.    Physical Exam   Vital Signs: Temp: 98.4  F (36.9  C) Temp src: Oral BP: 135/83 Pulse: 80   Resp: 18 SpO2: 95 % O2 Device: None (Room air)    Weight: 222 lbs 1.6 oz    Constitutional: Awake, alert, cooperative, no apparent distress, and appears stated age.  Eyes: Lids and lashes normal, extra ocular muscles intact, sclera clear, conjunctiva normal.  ENT: Normocephalic, atraumatic. Moist mucous membranes.  TM nonerythematous with cone of light appropriately visualized.  No rhinorrhea.  Nares clear and nonedematous nonerythematous bilaterally.  Respiratory: No increased work of breathing, no accessory muscle use, clear to auscultation bilaterally, no crackles or wheezing.  Cardiovascular: Regular rate and rhythm, normal S1 and S2, no S3 or S4, and no murmur noted  GI: Abdomen soft, non-tender, non-distended, no masses palpated. Bowel sounds present.  No tenderness to palpation.   Skin: No bruising or bleeding and normal skin color, texture, turgor.  Musculoskeletal: There is no redness, warmth, or swelling of the joints.  Full range of motion noted.  Tone is normal.  Neurologic: Awake, alert, oriented to name, place and time.  Cranial nerves II-XII are grossly intact.  Sensation intact bilaterally.        Data   ------------------------- PAST 24 HR DATA REVIEWED -----------------------------------------------    I have personally reviewed the following data over the past 24 hrs:    9.4  \   8.3 (L)   / 110 (L)     136 113 (H) 29 (H) /  108   4.1 18 (L) 1.43 (H) \       Procal: N/A CRP: N/A Lactic Acid: N/A         Imaging results reviewed over the past 24 hrs:   No results found for this or any previous visit (from the past 24 hour(s)).

## 2023-01-04 NOTE — PROGRESS NOTES
Addendum to H&P dated 1/2/23    # Hyponatremia  Clinically Insignificant, will monitor.     Stu Peters, DO

## 2023-01-04 NOTE — CONSULTS
Care Management Initial Consult    General Information  Assessment completed with: Patient,    Type of CM/SW Visit: Offer D/C Planning    Primary Care Provider verified and updated as needed: Yes   Readmission within the last 30 days: no previous admission in last 30 days      Reason for Consult: discharge planning  Advance Care Planning: Advance Care Planning Reviewed: verified with patient (no ACP docs per pt, declined)          Communication Assessment  Patient's communication style: spoken language (English or Bilingual)             Cognitive  Cognitive/Neuro/Behavioral: WDL  Level of Consciousness: alert  Arousal Level: opens eyes spontaneously     Mood/Behavior: calm          Living Environment:   People in home: child(ирина), dependent, significant other (boyfriend, 2 children)     Current living Arrangements: house      Able to return to prior arrangements: yes       Family/Social Support:  Care provided by: self  Provides care for: child(ирина)     Significant Other (boyfriend Mika)       Mika Herrmann [phone]  Description of Support System: Supportive         Current Resources:   Patient receiving home care services:  none     Community Resources:  none  Equipment currently used at home:  none  Supplies currently used at home:  none    Employment/Financial:  Employment Status: employed part-time        Financial Concerns: No concerns identified           Lifestyle & Psychosocial Needs:  Social Determinants of Health     Tobacco Use: Medium Risk     Smoking Tobacco Use: Former     Smokeless Tobacco Use: Unknown     Passive Exposure: Not on file   Alcohol Use: Not on file   Financial Resource Strain: Not on file   Food Insecurity: Not on file   Transportation Needs: Not on file   Physical Activity: Not on file   Stress: Not on file   Social Connections: Not on file   Intimate Partner Violence: Not on file   Depression: Not on file   Housing Stability: Not on file       Functional Status:  Prior to admission  patient needed assistance:   Dependent ADLs:: Independent  Dependent IADLs:: Independent       Mental Health Status:  Mental Health Status: No Current Concerns       Chemical Dependency Status:  Chemical Dependency Status: No Current Concerns             Values/Beliefs:  Spiritual, Cultural Beliefs, Yazidism Practices, Values that affect care: no               Additional Information:  RNCM met with patient, introduced self and CM role.  Pt lives at home with boyfriend Mika Herrmann [phone], and her 2 children.  Pt independent at baseline.  Pt plans to discharge to home, boyfriend Mika to transport to home.  Pt denies any CM needs.  CM will continue to be available.    Concepcion Paige RN

## 2023-01-04 NOTE — PLAN OF CARE
Problem: Infection  Goal: Absence of Infection Signs and Symptoms  Outcome: Progressing   Goal Outcome Evaluation: Patient A&Ox4. Febrile 102.9 overnight, very uncomfortable with left ear and eye pain/pressure/migraine pain and 'burning all over my body' which she associates with fever. Tylenol scheduled continued, MD notified and PRN orders adjusted, gabapentin, oxycodone, zofran, lidocaine patch, rizatriptan, tizanidine given PRN. Patient is feeling better this morning, headache and fever better at 99.1 but still with myalgias. Continues on IVF, IV antibiotics. Plan pending.

## 2023-01-04 NOTE — PLAN OF CARE
Problem: Plan of Care - These are the overarching goals to be used throughout the patient stay.    Goal: Readiness for Transition of Care  Outcome: Progressing     Problem: Fall Injury Risk  Goal: Absence of Fall and Fall-Related Injury  Outcome: Progressing  Intervention: Identify and Manage Contributors  Recent Flowsheet Documentation  Taken 1/3/2023 1834 by Aniya Denney, RN  Medication Review/Management: medications reviewed  Intervention: Promote Injury-Free Environment  Recent Flowsheet Documentation  Taken 1/3/2023 1834 by Aniya Denney, RN  Safety Promotion/Fall Prevention:    activity supervised    nonskid shoes/slippers when out of bed    room near nurse's station    room organization consistent    safety round/check completed    supervised activity  Taken 1/3/2023 0750 by Aniya Denney, RN  Safety Promotion/Fall Prevention:    activity supervised    bed alarm on    fall prevention program maintained    increase visualization of patient    nonskid shoes/slippers when out of bed    patient and family education    lighting adjusted    safety round/check completed   Goal Outcome Evaluation: Patient arrived to floor around  1815, complaints of slight pain, but not severe enough to need intervention. Double luman PICC and dressing intact. Vital signs within normal limits. Patient on tele. Currently resting comfortably in bed

## 2023-01-04 NOTE — PROGRESS NOTES
RENAL PROGRESS NOTE    CC:  Fever, nausea, vomiting     ASSESSMENT & PLAN:     KECIA - Baseline ESRD on dialysis for about two years prior to renal transplant in 2019. Baseline Cr appears to be 1.5-1.7, last labs almost one year ago Jan 2022. Cr peaked at 2.4, improved to 1.9 with IVF suggesting prerenal etiology with poor oral intakes and nausea and pyelonephritis. Less likely to be an acute rejection but following closely with missed doses of IS. Recommend supportive cares with ongoing IVF for now and plan for expectant management. With improved nausea/emesis will decrease IV NS to 75 ml/hour.      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 Follows with our service through [facility] transplant clinic. Immunosuppression as below.      Chronic immunosuppression - On Azathioprine 75 mg daily, prednisone 5 mg, and tacrolimus XR 7 mg QAM. On Bactrim MWF for PJP prophylaxis. Timing of IS drugs off today with delayed med rec, will draw Tacrolimus level 1/5. Expect it may be low with missed doses.      Pyelonephritis - Febrile with leukocytosis, turbid urine with microhematuria, pyuria, proteinuria, and pending UCx, BCx NGTD. On cefepime and Vancomycin per primary service.      Metabolic acidosis - Mild, continue oral bicarbonate for now      SLE - complicated by lupus nephritis as above. On tacrolimus, prednisone, and AZA. Follows with rheumatology as an outpatient.      Hyperlipidemia - on statin     SUBJECTIVE: Seen at bedside. Feeling a little better today with pain meds for back pain and migraine. Ate all of breakfast today, no vomiting and was able to keep down all medications.     OBJECTIVE:  Physical Exam   Temp: 99  F (37.2  C) Temp src: Oral BP: 127/86 Pulse: 93   Resp: 18 SpO2: 96 % O2 Device: None (Room air)    Vitals:    01/02/23 1521 01/04/23 0427   Weight: 95.3 kg (210 lb) 100.7 kg (222 lb 1.6 oz)     Vital Signs with Ranges  Temp:  [98.8  F (37.1  C)-102.9  F (39.4  C)] 99  F (37.2  C)  Pulse:   [] 93  Resp:  [17-20] 18  BP: (111-163)/(66-94) 127/86  SpO2:  [95 %-98 %] 96 %  I/O last 3 completed shifts:  In: 3593 [P.O.:480; I.V.:3113]  Out: 1250 [Urine:1250]    @TMAXR(24)@    Patient Vitals for the past 72 hrs:   Weight   01/04/23 0427 100.7 kg (222 lb 1.6 oz)   01/02/23 1521 95.3 kg (210 lb)       Intake/Output Summary (Last 24 hours) at 1/4/2023 1036  Last data filed at 1/4/2023 0800  Gross per 24 hour   Intake 2466 ml   Output 1150 ml   Net 1316 ml       PHYSICAL EXAM:  General - Alert and oriented x3, NAD, fatigued appearing   Cardiovascular - Regular rate and rhythm, no rub  Respiratory - Clear to auscultation bilaterally, no crackles or wheezes  Abd: BS present, no guarding or pain with palpation, no ascites  Extremities - No lower extremity edema bilaterally  Skin: dry, intact, no rash, good turgor  Neuro:  Grossly intact, no focal deficits  MSK:  Grossly intact  Psych:  Normal affect    LABORATORY STUDIES:     Recent Labs   Lab 01/03/23  0706 01/02/23  1607   WBC 12.6* 14.6*   RBC 3.16* 3.97   HGB 9.0* 11.1*   HCT 28.8* 35.3   * 202       Basic Metabolic Panel:  Recent Labs   Lab 01/04/23  0457 01/03/23  0706 01/02/23  2113 01/02/23  1607    139 137 134*   POTASSIUM 4.1 4.1 5.0 4.4   CHLORIDE 113* 112* 106 103   CO2 18* 19* 20* 20*   BUN 29* 38* 39* 35*   CR 1.43* 1.89* 2.37* 2.01*    100 101 116   JUNI 8.0* 8.0* 9.1 10.1       INRNo lab results found in last 7 days.     Recent Labs   Lab Test 01/03/23  0706 01/02/23  1607   WBC 12.6* 14.6*   HGB 9.0* 11.1*   * 202       Personally reviewed current labs      Leonor Patel PA-C   [facility]  [phone]

## 2023-01-04 NOTE — PHARMACY-VANCOMYCIN DOSING SERVICE
Pharmacy Vancomycin Note  Date of Service 2023  Patient's  1985   37 year old, female    Indication: Pyelonephritis  Day of Therapy: 3  Current vancomycin regimen:  1000 mg IV q24h  Current vancomycin monitoring method: AUC  Current vancomycin therapeutic monitoring goal: 400-600 mg*h/L    InsightRX Prediction of Current Vancomycin Regimen    Regimen: 1000 mg IV every 24 hours.  Start time: 08:43 on 2023  Exposure target: AUC24 (range)400-600 mg/L.hr   AUC24,ss: 392 mg/L.hr  Probability of AUC24 > 400: 47 %  Ctrough,ss: 11.8 mg/L  Probability of Ctrough,ss > 20: 4 %  Probability of nephrotoxicity (Lodise JOLIE ): 7 %    Current estimated CrCl = Estimated Creatinine Clearance: 47.9 mL/min (A) (based on SCr of 1.89 mg/dL (H)).    Creatinine for last 3 days  2023:  4:07 PM Creatinine 2.01 mg/dL;  9:13 PM Creatinine 2.37 mg/dL  1/3/2023:  7:06 AM Creatinine 1.89 mg/dL    Recent Vancomycin Levels (past 3 days)  2023:  4:57 AM Vancomycin 18.2 mg/L    Vancomycin IV Administrations (past 72 hours)                   vancomycin (VANCOCIN) 1,000 mg in 0.9% NaCl 250 mL intermittent infusion (mg) 1,000 mg New Bag 23    vancomycin (VANCOCIN) 2,000 mg in 0.9% NaCl 500 mL intermittent infusion (mg) 2,000 mg New Bag 23                Nephrotoxins and other renal medications (From now, onward)    Start     Dose/Rate Route Frequency Ordered Stop    23 2100  vancomycin (VANCOCIN) 1,250 mg in 0.9% NaCl 250 mL intermittent infusion         1,250 mg  over 90 Minutes Intravenous EVERY 24 HOURS 23 0741      23 1400  tacrolimus (ENVARSUS XR) 24 hr tablet 3 mg        Note to Pharmacy: PTA Sig:Take 3 mg by mouth every morning (before breakfast) (Total of 7mg in AM)   PATIENT SUPPLIED MEDICATION    3 mg Oral EVERY MORNING BEFORE BREAKFAST 23 1358      23 1400  tacrolimus (ENVARSUS XR) 24 hr tablet 4 mg        Note to Pharmacy: PTA Sig:Take 4 mg by mouth every  morning (before breakfast) (Total of 7mg in morning)  PATIENT SUPPLIED MEDICATION    4 mg Oral EVERY MORNING BEFORE BREAKFAST 01/03/23 1358               Contrast Orders - past 72 hours (72h ago, onward)    None          Interpretation of levels and current regimen:  Vancomycin level is reflective of AUC less than 400    Has serum creatinine changed greater than 50% in last 72 hours: No    Urine output:      Renal Function: Improving    InsightRX Prediction of Planned New Vancomycin Regimen  Regimen: 1250 mg IV every 24 hours.  Start time: 08:44 on 01/04/2023  Exposure target: AUC24 (range)400-600 mg/L.hr   AUC24,ss: 482 mg/L.hr  Probability of AUC24 > 400: 81 %  Ctrough,ss: 14.6 mg/L  Probability of Ctrough,ss > 20: 15 %  Probability of nephrotoxicity (Lodise JOLIE 2009): 10 %      Plan:  1. Increase Dose to 1250mg q24h  2. Vancomycin monitoring method: AUC  3. Vancomycin therapeutic monitoring goal: 400-600 mg*h/L  4. Pharmacy will check vancomycin levels as appropriate in 1-3 Days.  5. Serum creatinine levels will be ordered daily for the first week of therapy and at least twice weekly for subsequent weeks.    Gely Malone [facility]

## 2023-01-04 NOTE — PROVIDER NOTIFICATION
12:58 AM Paged Dr Peters of [facility] regarding patient is complaining of ongoing ear pain and pressure post oxycodone and gabapentin. She feels Toradol was effective in ED. Would we be able to try a dose? Or whatever you think would help    Please see new orders for tizanidine, lidocaine patch, rizatriptan and modification of oxycodone per Dr Peters. Toradol is contraindicated due to kidney function

## 2023-01-04 NOTE — CONSULTS
"[facility] ACUTE PAIN SERVICE CONSULTATION     Date of Admission:  1/2/2023  Date of Consult (When I saw the patient): 01/04/23  Physician requesting consult: Lane Fernandes MD  Reason for consult: Intractable migraine, refractory to multiple pain meds  Primary Care Physician: Nidia Fuentes MD     Assessment/Plan:     Mey Dixon is a 37 year old female who was admitted on 1/2/2023.  Pain team was asked to see the patient for intractable migraine. Admitted for sepsis due to pyelonephritis. History of lupus s/p right renal transplant (2019), migraine headache, and frontal headache. Per resident, patient usually gets injections, follows with [facility]. Reports has not had relief in the past with triptans, APAP, or other migraine medications in the past. She reports only minor relief with lidocaine patch,Tylenol, Tizanidine, rizatriptan, IV Dilaudid 0.5mg. Describes pain as 4-7/10 and headache \"feels like a balloon in my head\", left ear pressure, mid to low aching back pain that does not radiate. She reports headache started Thursday 12/29/22; Ear pressure, fever, chills, myalgias on 12/31/22.  She reports nausea. Denies chest pain or shortness of breath. Diet is renal diet. The patient does not smoke and denies chemical dependency history.      Opioid Induced Respiratory Depression Risk Assessment:?  Moderate due to the following risk factors: renal dysfunction, hepatic dysfunction, Obesity, opioid naive status    PLAN:   1) Pain is consistent with migraine headache/pressure, left ear pressure/congestion, low back pain. Labs and imaging indicated: I have personally reviewed pertinent labs, tests, and radiologic imaging in patient's chart. Treatment plan includes multimodal pain approach, Hospital Medicine Service for medical management. Patient educated regarding multimodal pain approach, medications as listed below. Patient is understanding of the plan. All questions and concerns addressed to " "patient's satisfaction.   2)Multimodal Medication Therapy  Topical: lidocaine ointment 5% qid, diclofenac gel 1% qid to back. Consider nasal decongestant spray such as  Oxymetazoline bid x2-3 days only (rhonda does report \"I can breathe through my nose, but it does feel congested\" - [place] [facility]. Could also consider compounded nasal lidocaine spray for headache.  On Flonase currently per Hospital Medicine Service.   NSAID'S: CrCl 63.4 mL/min. Avoid given h/o right renal transplant (2019).   Steroids: PTA prednisone 5mg daily   Muscle Relaxants: discontinue tizanidine - not reporting spasms   Adjuvants: APAP 650mg q6h, gabapentin 300 mg po daily prn neuropathic pain, headache (reports she takes Gabapentin at home once daily 2-3x monthly), prn Fioricet while inpatient, 1x dose Maxalt per Hospital Medicine Service   Antidepressants/anxiolytics: PTA trazodone 100mg hs   Opioids: oxycodone 5mg q6h prn   IV Pain medication: none  3)Non-medication interventions: ice vs heat  Acupuncture consult - declines   Integrative consult - if agreeable  4)Constipation Prophylaxis:  Senna/docusate 1 po bid prn  5) Care Teams: [facility]    -[place]  pulled from system on 1/4/2023 but no results found.     Discharge Recommendations - We recommend prescribing the following at the time of discharge: Will evaluate if there is need for opioid, topicals if helpful, APAP.      History of Present Illness (HPI):       Mey Dixon is a 37 year old female who presented for sepsis due to pyelonephritis, was having chills, body aches and fevers, dysuria for several days prior to admission.  Past Medical History as above. The pain is reported to be acute headache and mid to low back pain.      Per [place]  review, the patient does not have an opioid tolerance.    Opioid induced side effects noted and include: sedation, nausea    Reviewed medical record, labs, imaging, ED note, and care everywhere.     Past pain treatments have included: Pain " Clinic -no, has worked with [facility] for bilateral frontal headaches,  patient has had occipital nerve block injection, trigger point injections      Home pain medications/psych medications/anticoagulation medications include:   -PTA pain meds: Tylenol PM prn,   -PTA immunosuppressants: azathioprine 75 mg daily, prednisone 5mg daily, tacrolimus 3mg + 4mg daily   -PTA psych meds: trazodone 100mg hs   Patient has taken Maxalt, topiramate, gabapentin in the past.    Medical History   PAST MEDICAL HISTORY: History reviewed. No pertinent past medical history.    PAST SURGICAL HISTORY:   Past Surgical History:   Procedure Laterality Date     PICC DOUBLE LUMEN PLACEMENT  1/2/2023            FAMILY HISTORY: History reviewed. No pertinent family history.    SOCIAL HISTORY:   Social History     Tobacco Use     Smoking status: Former     Types: Cigarettes     Smokeless tobacco: Not on file   Substance Use Topics     Alcohol use: Not on file        [facility]  Tobacco:  reports that she has quit smoking. Her smoking use included cigarettes. She does not have any smokeless tobacco history on file.  Alcohol:  has no history on file for alcohol use.  Illicit drugs:  has no history on file for drug use.    Allergies  Allergies   Allergen Reactions     Cellcept [Mycophenolate] Diarrhea and GI Disturbance       Problem List  Patient Active Problem List    Diagnosis Date Noted     Pyelonephritis, acute 01/03/2023     Priority: Medium     Sinus tachycardia 01/03/2023     Priority: Medium     Avascular necrosis of femoral head, unspecified laterality (H) 01/03/2023     Priority: Medium     Lupus erythematosus 01/03/2023     Priority: Medium     Sepsis (H) 01/02/2023     Priority: Medium     Frontal headache 09/25/2019     Priority: Medium     Insomnia 09/25/2019     Priority: Medium     Migraine headache 09/25/2019     Priority: Medium       Prior to Admission Medications   Medications Prior to Admission  "  Medication Sig Dispense Refill Last Dose     azaTHIOprine (IMURAN) 50 MG tablet Take 75 mg by mouth daily   1/1/2023     CALCIUM ANTACID 500 MG chewable tablet Take 3,000 mg by mouth nightly as needed   Past Week     diphenhydrAMINE-acetaminophen (TYLENOL PM)  MG tablet Take 2 tablets by mouth nightly as needed for sleep   12/30/2022     doxycycline hyclate (VIBRA-TABS) 100 MG tablet Take 100 mg by mouth 2 times daily   1/2/2023 at took initial dose     ondansetron (ZOFRAN ODT) 4 MG ODT tab Take 4 mg by mouth every 6 hours as needed   1/1/2023     pantoprazole (PROTONIX) 40 MG EC tablet Take 40 mg by mouth At Bedtime   12/30/2022     predniSONE (DELTASONE) 5 MG tablet Take 5 mg by mouth daily   1/1/2023     rosuvastatin (CRESTOR) 20 MG tablet Take 20 mg by mouth At Bedtime   12/30/2022     sodium bicarbonate 650 MG tablet Take 650 mg by mouth 2 times daily   1/1/2023     tacrolimus (ENVARSUS XR) 1 MG 24 hr tablet Take 3 mg by mouth every morning (before breakfast) (Total of 7mg in AM)   1/1/2023     tacrolimus (ENVARSUS XR) 4 MG 24 hr tablet Take 4 mg by mouth every morning (before breakfast) (Total of 7mg in morning)   1/1/2023     traZODone (DESYREL) 50 MG tablet Take 100 mg by mouth At Bedtime daily   12/30/2022     TRI-SPRINTEC 0.18/0.215/0.25 MG-35 MCG tablet Take 1 tablet by mouth daily   1/1/2023     VITAMIN D3 25 MCG (1000 UT) tablet Take 25 mcg by mouth 2 times daily   1/1/2023     sulfamethoxazole-trimethoprim (BACTRIM) 400-80 MG tablet Take 1 tablet by mouth three times a week MWF   12/30/2022       Review of Systems  Complete ROS reviewed, unless noted in HPI, all other systems reviewed (with patient) and all others found to be negative.      Objective:     Physical Exam:  /83 (BP Location: Left arm)   Pulse 80   Temp 98.4  F (36.9  C) (Oral)   Resp 18   Ht 1.638 m (5' 4.5\")   Wt 100.7 kg (222 lb 1.6 oz)   SpO2 95%   BMI 37.53 kg/m    Weight:   Vitals:    01/02/23 1521 01/04/23 0427 "   Weight: 95.3 kg (210 lb) 100.7 kg (222 lb 1.6 oz)      Body mass index is 37.53 kg/m .    General Appearance:  Alert, cooperative, no distress, appears lethargic    Head:  Normocephalic, without obvious abnormality, atraumatic   Eyes:  PERRL, conjunctiva/corneas clear, EOM's intact   ENT/Throat: Lips, mucosa, and tongue normal; teeth and gums normal   Lymph/Neck: Supple, symmetrical, trachea midline   Lungs:   Clear to auscultation bilaterally, respirations unlabored, room air   Cardiovascular/Heart:  Regular rate and rhythm, S1, S2 normal   Abdomen:   Soft, non-tender, bowel sounds active all four quadrants   Musculoskeletal: Extremities normal, atraumatic   Skin: Skin warm, dry    Neurologic: Alert and oriented X 3, Moves all 4 extremities     Psych: Affect is flat      Imaging: Reviewed I have personally reviewed pertinent labs, tests, and radiologic imaging in patient's chart.  Chest XR,  PA & LAT    Result Date: 1/2/2023  EXAM: XR CHEST 2 VIEWS LOCATION: [facility] DATE/TIME: 01/02/2023, 4:31 PM INDICATION: Cough, fever. COMPARISON: Chest x-ray 09/30/2022.     IMPRESSION: Negative chest.     CT Abdomen Pelvis w/o Contrast    Result Date: 1/2/2023  EXAM: CT ABDOMEN PELVIS W/O CONTRAST LOCATION: [facility] DATE/TIME: 1/2/2023 7:07 PM INDICATION: fever, UTI, transplanted kidney COMPARISON: 9/30/2022 TECHNIQUE: CT scan of the abdomen and pelvis was performed without IV contrast. Multiplanar reformats were obtained. Dose reduction techniques were used. CONTRAST: None. FINDINGS: LOWER CHEST: Normal. HEPATOBILIARY: Hepatic steatosis. PANCREAS: Normal. SPLEEN: Normal. ADRENAL GLANDS: Normal. NATIVE KIDNEYS/BLADDER: The kidneys are atrophic. A simple cyst lower pole right kidney, no follow-up required. No hydronephrosis. TRANSPLANT KIDNEY: A right pelvic transplant kidney. There are 3 stable adjacent stones in the upper pole with the largest measuring 0.6 cm. No  hydronephrosis. Trace perinephric fat stranding. BOWEL: No obstruction or inflammatory change. Normal appendix. LYMPH NODES: Normal. VASCULATURE: Unremarkable. PELVIC ORGANS: Normal. MUSCULOSKELETAL: Stable mild subchondral sclerosis in the femoral head is consistent with avascular necrosis.     IMPRESSION: 1.  No acute abnormality. 2.  Nephrolithiasis of the transplant kidney. 3.  Hepatic steatosis. 4.  Avascular necrosis of the femoral heads.     Head CT w/o contrast    Result Date: 1/2/2023  EXAM: CT HEAD W/O CONTRAST LOCATION: [facility] DATE/TIME: 1/2/2023 7:02 PM INDICATION: fever, headache, immusupprsed COMPARISON: 09/30/2022. TECHNIQUE: Routine CT Head without IV contrast. Multiplanar reformats. Dose reduction techniques were used. FINDINGS: INTRACRANIAL CONTENTS: No intracranial hemorrhage, extraaxial collection, or mass effect.  No CT evidence of acute infarct. Normal parenchymal attenuation. Normal ventricles and sulci. VISUALIZED ORBITS/SINUSES/MASTOIDS: No intraorbital abnormality. No paranasal sinus mucosal disease. No middle ear or mastoid effusion. BONES/SOFT TISSUES: No acute abnormality.     IMPRESSION: 1.  No CT finding of a mass, hemorrhage or focal area suggestive of acute infarct.      Labs: Reviewed I have personally reviewed pertinent labs, tests, and radiologic imaging in patient's chart.  Recent Results (from the past 24 hour(s))   Vancomycin level    Collection Time: 01/04/23  4:57 AM   Result Value Ref Range    Vancomycin 18.2   mg/L   Basic metabolic panel    Collection Time: 01/04/23  4:57 AM   Result Value Ref Range    Sodium 136 136 - 145 mmol/L    Potassium 4.1 3.5 - 5.0 mmol/L    Chloride 113 (H) 98 - 107 mmol/L    Carbon Dioxide (CO2) 18 (L) 22 - 31 mmol/L    Anion Gap 5 5 - 18 mmol/L    Urea Nitrogen 29 (H) 8 - 22 mg/dL    Creatinine 1.43 (H) 0.60 - 1.10 mg/dL    Calcium 8.0 (L) 8.5 - 10.5 mg/dL    Glucose 108 70 - 125 mg/dL    GFR Estimate 49 (L) >60  mL/min/1.73m2   CBC with platelets    Collection Time: 01/04/23 10:54 AM   Result Value Ref Range    WBC Count 9.4 4.0 - 11.0 10e3/uL    RBC Count 2.95 (L) 3.80 - 5.20 10e6/uL    Hemoglobin 8.3 (L) 11.7 - 15.7 g/dL    Hematocrit 27.3 (L) 35.0 - 47.0 %    MCV 93 78 - 100 fL    MCH 28.1 26.5 - 33.0 pg    MCHC 30.4 (L) 31.5 - 36.5 g/dL    RDW 15.2 (H) 10.0 - 15.0 %    Platelet Count 110 (L) 150 - 450 10e3/uL       Total time spent 80 minutes with greater than 50% in consultation, education and coordination of care.     Also discussed with RN, pharmacist.     Elements of Medical Decision Making as described above. High risk therapy including opioids, high risk drug therapy including parental controlled substances.       Thank you for this consultation.    KIKI Wisdom-C  Acute Care Pain Management Program  [facility] ([place], [place], [place])  Monday-Friday 8a-4p   Page via online paging system or call [phone]

## 2023-01-05 LAB
ANION GAP SERPL CALCULATED.3IONS-SCNC: 5 MMOL/L (ref 5–18)
BUN SERPL-MCNC: 26 MG/DL (ref 8–22)
CALCIUM SERPL-MCNC: 8.2 MG/DL (ref 8.5–10.5)
CHLORIDE BLD-SCNC: 115 MMOL/L (ref 98–107)
CO2 SERPL-SCNC: 20 MMOL/L (ref 22–31)
CREAT SERPL-MCNC: 1.28 MG/DL (ref 0.6–1.1)
ERYTHROCYTE [DISTWIDTH] IN BLOOD BY AUTOMATED COUNT: 15.1 % (ref 10–15)
GFR SERPL CREATININE-BSD FRML MDRD: 55 ML/MIN/1.73M2
GLUCOSE BLD-MCNC: 117 MG/DL (ref 70–125)
HCT VFR BLD AUTO: 28 % (ref 35–47)
HGB BLD-MCNC: 8.4 G/DL (ref 11.7–15.7)
MCH RBC QN AUTO: 27.6 PG (ref 26.5–33)
MCHC RBC AUTO-ENTMCNC: 30 G/DL (ref 31.5–36.5)
MCV RBC AUTO: 92 FL (ref 78–100)
PLATELET # BLD AUTO: 117 10E3/UL (ref 150–450)
POTASSIUM BLD-SCNC: 4.1 MMOL/L (ref 3.5–5)
RBC # BLD AUTO: 3.04 10E6/UL (ref 3.8–5.2)
SODIUM SERPL-SCNC: 140 MMOL/L (ref 136–145)
TACROLIMUS BLD-MCNC: 25.2 UG/L (ref 5–15)
TME LAST DOSE: ABNORMAL H
TME LAST DOSE: ABNORMAL H
WBC # BLD AUTO: 7.3 10E3/UL (ref 4–11)

## 2023-01-05 PROCEDURE — 80197 ASSAY OF TACROLIMUS: CPT | Performed by: STUDENT IN AN ORGANIZED HEALTH CARE EDUCATION/TRAINING PROGRAM

## 2023-01-05 PROCEDURE — 99233 SBSQ HOSP IP/OBS HIGH 50: CPT | Mod: GC

## 2023-01-05 PROCEDURE — 250N000011 HC RX IP 250 OP 636: Performed by: STUDENT IN AN ORGANIZED HEALTH CARE EDUCATION/TRAINING PROGRAM

## 2023-01-05 PROCEDURE — 80048 BASIC METABOLIC PNL TOTAL CA: CPT

## 2023-01-05 PROCEDURE — 250N000013 HC RX MED GY IP 250 OP 250 PS 637

## 2023-01-05 PROCEDURE — 250N000013 HC RX MED GY IP 250 OP 250 PS 637: Performed by: NURSE PRACTITIONER

## 2023-01-05 PROCEDURE — 250N000012 HC RX MED GY IP 250 OP 636 PS 637

## 2023-01-05 PROCEDURE — 85049 AUTOMATED PLATELET COUNT: CPT

## 2023-01-05 PROCEDURE — 250N000009 HC RX 250: Performed by: STUDENT IN AN ORGANIZED HEALTH CARE EDUCATION/TRAINING PROGRAM

## 2023-01-05 PROCEDURE — 99232 SBSQ HOSP IP/OBS MODERATE 35: CPT | Performed by: PHYSICIAN ASSISTANT

## 2023-01-05 PROCEDURE — 120N000001 HC R&B MED SURG/OB

## 2023-01-05 PROCEDURE — 258N000003 HC RX IP 258 OP 636: Performed by: STUDENT IN AN ORGANIZED HEALTH CARE EDUCATION/TRAINING PROGRAM

## 2023-01-05 PROCEDURE — 250N000013 HC RX MED GY IP 250 OP 250 PS 637: Performed by: STUDENT IN AN ORGANIZED HEALTH CARE EDUCATION/TRAINING PROGRAM

## 2023-01-05 RX ORDER — OXYMETAZOLINE HYDROCHLORIDE 0.05 G/100ML
2 SPRAY NASAL 2 TIMES DAILY PRN
Status: DISCONTINUED | OUTPATIENT
Start: 2023-01-05 | End: 2023-01-06 | Stop reason: HOSPADM

## 2023-01-05 RX ORDER — HYDROXYZINE HYDROCHLORIDE 25 MG/1
50 TABLET, FILM COATED ORAL EVERY 6 HOURS PRN
Status: DISCONTINUED | OUTPATIENT
Start: 2023-01-05 | End: 2023-01-06 | Stop reason: HOSPADM

## 2023-01-05 RX ORDER — ACETAMINOPHEN 325 MG/1
650 TABLET ORAL EVERY 6 HOURS PRN
Status: DISCONTINUED | OUTPATIENT
Start: 2023-01-05 | End: 2023-01-06 | Stop reason: HOSPADM

## 2023-01-05 RX ORDER — ACETIC ACID 20.65 MG/ML
4 SOLUTION AURICULAR (OTIC) 3 TIMES DAILY
Status: DISCONTINUED | OUTPATIENT
Start: 2023-01-05 | End: 2023-01-05

## 2023-01-05 RX ADMIN — ACETAMINOPHEN 650 MG: 325 TABLET ORAL at 10:30

## 2023-01-05 RX ADMIN — CEFEPIME 2 G: 2 INJECTION, POWDER, FOR SOLUTION INTRAVENOUS at 09:24

## 2023-01-05 RX ADMIN — CEFEPIME 2 G: 2 INJECTION, POWDER, FOR SOLUTION INTRAVENOUS at 20:47

## 2023-01-05 RX ADMIN — ACETAMINOPHEN 650 MG: 325 TABLET ORAL at 06:20

## 2023-01-05 RX ADMIN — CHOLECALCIFEROL TAB 25 MCG (1000 UNIT) 25 MCG: 25 TAB at 09:25

## 2023-01-05 RX ADMIN — ROSUVASTATIN CALCIUM 20 MG: 10 TABLET, FILM COATED ORAL at 20:47

## 2023-01-05 RX ADMIN — FLUTICASONE PROPIONATE 1 SPRAY: 50 SPRAY, METERED NASAL at 09:25

## 2023-01-05 RX ADMIN — PANTOPRAZOLE SODIUM 40 MG: 20 TABLET, DELAYED RELEASE ORAL at 20:47

## 2023-01-05 RX ADMIN — LIDOCAINE: 50 OINTMENT TOPICAL at 20:00

## 2023-01-05 RX ADMIN — Medication 75 MG: at 09:25

## 2023-01-05 RX ADMIN — ONDANSETRON 4 MG: 4 TABLET, ORALLY DISINTEGRATING ORAL at 10:30

## 2023-01-05 RX ADMIN — GABAPENTIN 300 MG: 300 CAPSULE ORAL at 10:33

## 2023-01-05 RX ADMIN — GABAPENTIN 300 MG: 300 CAPSULE ORAL at 19:07

## 2023-01-05 RX ADMIN — SODIUM BICARBONATE 650 MG TABLET 650 MG: at 09:25

## 2023-01-05 RX ADMIN — DICLOFENAC SODIUM 2 G: 10 GEL TOPICAL at 10:34

## 2023-01-05 RX ADMIN — OXYMETAZOLINE HYDROCHLORIDE 2 SPRAY: 0.05 SPRAY NASAL at 19:59

## 2023-01-05 RX ADMIN — DICLOFENAC SODIUM 2 G: 10 GEL TOPICAL at 06:30

## 2023-01-05 RX ADMIN — CHOLECALCIFEROL TAB 25 MCG (1000 UNIT) 25 MCG: 25 TAB at 20:47

## 2023-01-05 RX ADMIN — SODIUM BICARBONATE 650 MG TABLET 650 MG: at 20:47

## 2023-01-05 RX ADMIN — OXYCODONE HYDROCHLORIDE 5 MG: 5 TABLET ORAL at 06:29

## 2023-01-05 RX ADMIN — VANCOMYCIN HYDROCHLORIDE 1750 MG: 5 INJECTION, POWDER, LYOPHILIZED, FOR SOLUTION INTRAVENOUS at 22:33

## 2023-01-05 RX ADMIN — OXYCODONE HYDROCHLORIDE 5 MG: 5 TABLET ORAL at 15:59

## 2023-01-05 RX ADMIN — TRAZODONE HYDROCHLORIDE 100 MG: 100 TABLET ORAL at 20:47

## 2023-01-05 RX ADMIN — DICLOFENAC SODIUM 2 G: 10 GEL TOPICAL at 15:59

## 2023-01-05 RX ADMIN — LIDOCAINE: 50 OINTMENT TOPICAL at 09:25

## 2023-01-05 RX ADMIN — LIDOCAINE: 50 OINTMENT TOPICAL at 13:29

## 2023-01-05 RX ADMIN — PREDNISONE 5 MG: 5 TABLET ORAL at 09:25

## 2023-01-05 RX ADMIN — HYDROXYZINE HYDROCHLORIDE 50 MG: 25 TABLET ORAL at 19:59

## 2023-01-05 ASSESSMENT — ACTIVITIES OF DAILY LIVING (ADL)
ADLS_ACUITY_SCORE: 20

## 2023-01-05 NOTE — PROGRESS NOTES
"Pt endorsing head/ear pain, offered options we have for her pain medication, she didn't want any.  Stating \"I take it at home and it doesn't fucking work.\"       Pt's significant other was requesting a note for work because he missed days, educated him that we do not write notes for people who aren't our patients.    "

## 2023-01-05 NOTE — PROGRESS NOTES
"    RENAL PROGRESS NOTE    CC:  Fever, nausea, vomiting     ASSESSMENT & PLAN:     KECIA - Baseline ESRD on dialysis for about two years prior to renal transplant in 2019. Baseline Cr appears to be 1.5-1.7, last labs almost one year ago Jan 2022. Cr peaked at 2.4, improved to 1.9 with IVF suggesting prerenal etiology with poor oral intakes and nausea and pyelonephritis. Less likely to be an acute rejection but following closely with missed doses of IS. Recommend supportive cares and plan for expectant management. Will stop IVF today and follow.      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 Follows with our service through [facility] transplant clinic. Immunosuppression as below.      Chronic immunosuppression - On Azathioprine 75 mg daily, prednisone 5 mg, and tacrolimus XR 7 mg QAM. On Bactrim MWF for PJP prophylaxis. Had some missed/delayed doses with illness, nausea prior to admission. Tacrolimus level 1/5 pending.      Pyelonephritis - Febrile with leukocytosis, turbid urine with microhematuria, pyuria, proteinuria, and pending UCx, BCx NGTD. On cefepime and Vancomycin per primary service.      Metabolic acidosis - Mild, continue oral bicarbonate for now     Migraine - Chronic migraine headaches and follows with neurology outpatient. Severe this admission and not responding to typical treatments. Pain service now following. Recommend complete NSAID abstinence.      SLE - complicated by lupus nephritis as above. On tacrolimus, prednisone, and AZA. Follows with rheumatology as an outpatient.      Hyperlipidemia - on statin     SUBJECTIVE: Seen at bedside, boyfriend present also. Feeling \"horrible\" today, reports 10/10 migraine headache pain and nasal congestion. Has not responded to PRN pain meds this am, received Tylenol, topical lidocaine, oxycodone. Denies dizziness, lightheadedness, visual disturbance.     OBJECTIVE:  Physical Exam   Temp: 98.8  F (37.1  C) Temp src: Oral BP: (!) 160/97 (RN Informed) " Pulse: 96   Resp: 16 SpO2: 99 % O2 Device: None (Room air)    Vitals:    01/02/23 1521 01/04/23 0427 01/05/23 0410   Weight: 95.3 kg (210 lb) 100.7 kg (222 lb 1.6 oz) 103 kg (227 lb 1.2 oz)     Vital Signs with Ranges  Temp:  [97.5  F (36.4  C)-98.8  F (37.1  C)] 98.8  F (37.1  C)  Pulse:  [73-96] 96  Resp:  [16-18] 16  BP: (127-160)/(72-97) 160/97  SpO2:  [95 %-99 %] 99 %  I/O last 3 completed shifts:  In: 2239 [P.O.:240; I.V.:1999]  Out: 2150 [Urine:2150]    @TMAXR(24)@    Patient Vitals for the past 72 hrs:   Weight   01/05/23 0410 103 kg (227 lb 1.2 oz)   01/04/23 0427 100.7 kg (222 lb 1.6 oz)   01/02/23 1521 95.3 kg (210 lb)       Intake/Output Summary (Last 24 hours) at 1/4/2023 1036  Last data filed at 1/4/2023 0800  Gross per 24 hour   Intake 2466 ml   Output 1150 ml   Net 1316 ml       PHYSICAL EXAM:  General - Alert and oriented x3, NAD, fatigued appearing   Cardiovascular - Regular rate and rhythm, no rub  Respiratory - Clear to auscultation bilaterally, no crackles or wheezes  Abd: BS present, no guarding or pain with palpation, no ascites  Extremities - No lower extremity edema bilaterally  Skin: dry, intact, no rash, good turgor  Neuro:  Grossly intact, no focal deficits  MSK:  Grossly intact  Psych:  Flat affect    LABORATORY STUDIES:     Recent Labs   Lab 01/05/23  0613 01/04/23  1054 01/03/23  0706 01/02/23  1607   WBC 7.3 9.4 12.6* 14.6*   RBC 3.04* 2.95* 3.16* 3.97   HGB 8.4* 8.3* 9.0* 11.1*   HCT 28.0* 27.3* 28.8* 35.3   * 110* 138* 202       Basic Metabolic Panel:  Recent Labs   Lab 01/05/23  0613 01/04/23  0457 01/03/23  0706 01/02/23  2113 01/02/23  1607    136 139 137 134*   POTASSIUM 4.1 4.1 4.1 5.0 4.4   CHLORIDE 115* 113* 112* 106 103   CO2 20* 18* 19* 20* 20*   BUN 26* 29* 38* 39* 35*   CR 1.28* 1.43* 1.89* 2.37* 2.01*    108 100 101 116   JUNI 8.2* 8.0* 8.0* 9.1 10.1       INRNo lab results found in last 7 days.     Recent Labs   Lab Test 01/05/23  0613 01/04/23  1054    WBC 7.3 9.4   HGB 8.4* 8.3*   * 110*       Personally reviewed current labs      Leonor Patel PA-C   [facility]  [phone]

## 2023-01-05 NOTE — PLAN OF CARE
Problem: Infection  Goal: Absence of Infection Signs and Symptoms  Outcome: Progressing   Goal Outcome Evaluation:       Vitals are stable, patient is afebrile. Patient complaining of back pain and headaches. Pain team ordered new medications. Patient complained of mild nausea, improved after zofran. Patient is tolerating diet, voiding without difficulty.

## 2023-01-05 NOTE — CONSULTS
Integrative Therapy Consult    Healing PresenceYes  Essential Oils: Topical (EO/Topical Oil), Inhalation (EO/Topical oil)     Respiratory Support Blend - HC, Orthopedic Blend - HC, Lavender Massage Oil - HC       Healing Music: TV/Care channel     Breathwork:       Guided Imagery:       Acupressure: Hands on Li4     Oshibori:       Energy Therapy:       Healing Touch:       Reiki:       Qi Gong:     Massage: Hand, Foot      Targeted Massage:    Sleep Promotion:       Other Therapy:       Intervention Reason: Emotional Distress, Anxiety/Stress, Pain     Pre and Post Session Scores: Patient Desires Treatment: yes  Pre-Session Anxiety: 10  Post-session Anxiety: 5     Pre-session Pain: 10 Post-session Pain: 2               Delivery:         Referrals:      Tiffany Dugan

## 2023-01-05 NOTE — PHARMACY-VANCOMYCIN DOSING SERVICE
Pharmacy Vancomycin Note  Date of Service 2023  Patient's  1985   37 year old, female    Indication: Pyelonephritis  Day of Therapy: 4  Current vancomycin regimen:  1250 mg IV q24h  Current vancomycin monitoring method: AUC  Current vancomycin therapeutic monitoring goal: 400-600 mg*h/L    InsightRX Prediction of Current Vancomycin Regimen  Loading dose: N/A  Regimen: 1250 mg IV every 24 hours.  Start time: 10:20 on 2023  Exposure target: AUC24 (range)400-600 mg/L.hr   AUC24,ss: 355 mg/L.hr  Probability of AUC24 > 400: 31 %  Ctrough,ss: 9.5 mg/L  Probability of Ctrough,ss > 20: 2 %  Probability of nephrotoxicity (Lodise JOLIE ): 5 %      Current estimated CrCl = Estimated Creatinine Clearance: 71.7 mL/min (A) (based on SCr of 1.28 mg/dL (H)).    Creatinine for last 3 days  2023:  4:07 PM Creatinine 2.01 mg/dL;  9:13 PM Creatinine 2.37 mg/dL  1/3/2023:  7:06 AM Creatinine 1.89 mg/dL  2023:  4:57 AM Creatinine 1.43 mg/dL  2023:  6:13 AM Creatinine 1.28 mg/dL    Recent Vancomycin Levels (past 3 days)  2023:  4:57 AM Vancomycin 18.2 mg/L    Vancomycin IV Administrations (past 72 hours)                   vancomycin (VANCOCIN) 1,250 mg in 0.9% NaCl 250 mL intermittent infusion (mg) 1,250 mg New Bag 23    vancomycin (VANCOCIN) 1,000 mg in 0.9% NaCl 250 mL intermittent infusion (mg) 1,000 mg New Bag 23    vancomycin (VANCOCIN) 2,000 mg in 0.9% NaCl 500 mL intermittent infusion (mg) 2,000 mg New Bag 23 211                Nephrotoxins and other renal medications (From now, onward)    Start     Dose/Rate Route Frequency Ordered Stop    23 2100  vancomycin (VANCOCIN) 1,250 mg in 0.9% NaCl 250 mL intermittent infusion         1,250 mg  over 90 Minutes Intravenous EVERY 24 HOURS 23 0741      23 1400  tacrolimus (ENVARSUS XR) 24 hr tablet 3 mg        Note to Pharmacy: PTA Sig:Take 3 mg by mouth every morning (before breakfast) (Total of 7mg  in AM)   PATIENT SUPPLIED MEDICATION    3 mg Oral EVERY MORNING BEFORE BREAKFAST 01/03/23 1358      01/03/23 1400  tacrolimus (ENVARSUS XR) 24 hr tablet 4 mg        Note to Pharmacy: PTA Sig:Take 4 mg by mouth every morning (before breakfast) (Total of 7mg in morning)  PATIENT SUPPLIED MEDICATION    4 mg Oral EVERY MORNING BEFORE BREAKFAST 01/03/23 1358               Contrast Orders - past 72 hours (72h ago, onward)    None          Interpretation of levels and current regimen:  Vancomycin level is reflective of AUC less than 400    Has serum creatinine changed greater than 50% in last 72 hours: No    Urine output:  unable to determine    Renal Function: Improving    InsightRX Prediction of Planned New Vancomycin Regimen  Loading dose: N/A  Regimen: 1750 mg IV every 24 hours.  Start time: 10:20 on 01/05/2023  Exposure target: AUC24 (range)400-600 mg/L.hr   AUC24,ss: 487 mg/L.hr  Probability of AUC24 > 400: 81 %  Ctrough,ss: 13.2 mg/L  Probability of Ctrough,ss > 20: 12 %  Probability of nephrotoxicity (Lodise JOLIE 2009): 8 %    Plan:  1. Increase Dose to 1750 mg q24h  2. Vancomycin monitoring method: AUC  3. Vancomycin therapeutic monitoring goal: 400-600 mg*h/L  4. Pharmacy will check vancomycin levels as appropriate in 1-3 Days.  5. Serum creatinine levels will be ordered daily for the first week of therapy and at least twice weekly for subsequent weeks.    ZANDER WAKEFIELD, PharmD.

## 2023-01-05 NOTE — PROGRESS NOTES
Care Management Follow Up    Length of Stay (days): 3    Expected Discharge Date: 01/06/2023     Concerns to be Addressed: no discharge needs identified     Patient plan of care discussed at interdisciplinary rounds: Yes    Anticipated Discharge Disposition: Home     Anticipated Discharge Services: None  Anticipated Discharge DME: None    Patient/Family in Agreement with the Plan: yes    Additional Information:  Chart assessed.  Pt not medically ready for discharge.  Continues on IV antibiotics.  Anticipate no CM needs.  CM will continue to be available.      Concepcion Paige RN

## 2023-01-05 NOTE — PROGRESS NOTES
[facility]    Progress Note - Hospitalist Service       Date of Admission:  1/2/2023    Assessment & Plan      Mey Dixon is a 37 year old old female with a past medical history of lupus s/p right renal transplant (2019), who was admitted for sepsis due to pyelonephritis. Patient improving, nephrology and pain team following.     Sepsis, resolved  Right pyelonephritis, improving  S/p R Renal Transplant   Patient remains stable with VSS, afebrile.  Multimodal pain control with pain team consulted and following.   Condition continues to improve, fluids discontinued by nephrology. Discussed patient with Nephrology LUCRECIA Patel regarding timeframe and agent for transition to PO antibiotics, and she will discuss with the attending for a recommendation.    Nephrology consult, appreciate recommendations    Recommend supportive cares, plan for expectant management    Continue immunosuppression    Draw tacrolimus level 1/5    Continue oral bicarbonate    Possible transition to PO abx tomorrow 1/6    Follow urine and blood cultures, NGTD and urine culture showing mixed urogenital catrachita    Continue cefepime (renal dose) and vancomycin (1/2 - )    Pain team consulted, appreciate recommendations    Zofran and Compazine as needed    AM BMP and CBC    Renal diet    PTA Bactrim prophylaxis     Intractable migraine  Ear pain, bilateral  Frontal sinus pain  Believe her headache and ear pain and sinus pain are all 2/2 intractable migraine.  She usually gets Botox injections, has not had relief in the past with triptans or other migraine medications.  Only minor relief with lidocaine patch Tylenol and rizatriptan, given one-time dose of Dilaudid yesterday as she states it is the only thing that helps.  Pain team consulted for this, appreciate their assistance.  We will continue Flonase as she has not complained of congestion today, and started Vosol ear drops. No evidence of sinusitis or otitis  "media or externa or URI symptoms. She was agitated with nursing staff for offering tylenol so this was changed from scheduled to prn.  - Pain management consult placed, appreciate recommendations. Please see their note for pain management plan.  - Flonase every day  - Vosol otic drops TID     Holosystolic murmur  Previously noted to have murmur related to her anemia.  Intermittently present.    Monitor, if blood cultures positive then consider echo.  Cultures currently NGTD.     Lupus  Reportedly on prednisone, azathioprine, and tacrolimus    PharmD confirming doses in AM. Pt skipped 1/2/23 dose due to nausea, vomiting.      Tachycardia, resolved  Likely related to fever as above. EKG shows sinus tachycardia.  Patient no longer tachycardic and has been taken off cardiac telemetry.    Avascular necrosis of femoral heads  Stable and mild, asymptomatic    Monitor        Chronic Conditions  GERD: Continue PTA Protonix  Hyperlipidemia: Continue PTA rosuvastatin  Depression: Continue PTA trazodone         Diet: Renal Diet (non-dialysis)    DVT Prophylaxis: Pneumatic Compression Devices  Morel Catheter: Not present  Fluids: 0.9% NS at 125 mL/HR  Lines: PRESENT      PICC 01/02/23 Double Lumen Right Brachial vein medial-Site Assessment: WDL;Ecchymotic      Cardiac Monitoring: None  Code Status: Full Code      Clinically Significant Risk Factors                # Thrombocytopenia: Lowest platelets = 110 in last 2 days, will monitor for bleeding          # Obesity: Estimated body mass index is 38.38 kg/m  as calculated from the following:    Height as of this encounter: 1.638 m (5' 4.5\").    Weight as of this encounter: 103 kg (227 lb 1.2 oz)., PRESENT ON ADMISSION         Disposition Plan     Expected Discharge Date: 01/06/2023      Destination: home  Discharge Comments: Monitor Kidneys        The patient's care was discussed with the Attending Physician, Dr. Mable Gallo.    Christopher Weeks, DO  [facility]  Securely message with [employer] (more info)  Text page via [facility] Paging/Directory   ______________________________________________________________________    Interval History   Patient was seen in the room where she was sitting in bed.  She states she is not feeling back pain or abdominal pain, no fevers or chills, and states her biggest concern is her migraine headache and that should be the primary focus.  She reports that the only thing that has helped her pain has been Dilaudid.  Discussed further outpatient evaluation with her PCP regarding other possible medications for her migraines.  Good relief with integrative therapy.  Improved today, anticipate discharge tomorrow pending nephrology recommendations.    Per nursing staff patient was agitated, swearing, and yelling at staff are offering Tylenol as this never works for her.    Physical Exam   Vital Signs: Temp: 98.5  F (36.9  C) Temp src: Oral BP: (!) 148/93 Pulse: 94   Resp: 16 SpO2: 97 % O2 Device: None (Room air)    Weight: 227 lbs 1.18 oz    Constitutional: Awake, alert, cooperative, no apparent distress, and appears stated age.  Eyes: Lids and lashes normal, extra ocular muscles intact, sclera clear, conjunctiva normal.  ENT: Normocephalic, atraumatic. Moist mucous membranes.  TM nonerythematous with cone of light appropriately visualized.  No rhinorrhea.  Nares clear and nonedematous nonerythematous bilaterally.  Frontal sinuses tender to percussion bilaterally.  Respiratory: No increased work of breathing, no accessory muscle use, clear to auscultation bilaterally, no crackles or wheezing.  Cardiovascular: Regular rate and rhythm, normal S1 and S2, no S3 or S4, and no murmur noted  GI: Abdomen soft, non-tender, non-distended, no masses palpated. Bowel sounds present.  No tenderness to palpation.   Skin: No bruising or bleeding and normal skin color, texture, turgor.  Musculoskeletal: There is no redness, warmth, or swelling  of the joints.  Full range of motion noted.  Tone is normal.  Neurologic: Awake, alert, oriented to name, place and time.  Cranial nerves II-XII are grossly intact.  Sensation intact bilaterally.        Data   ------------------------- PAST 24 HR DATA REVIEWED -----------------------------------------------    I have personally reviewed the following data over the past 24 hrs:    7.3  \   8.4 (L)   / 117 (L)     140 115 (H) 26 (H) /  117   4.1 20 (L) 1.28 (H) \       Imaging results reviewed over the past 24 hrs:   No results found for this or any previous visit (from the past 24 hour(s)).

## 2023-01-05 NOTE — PLAN OF CARE
Problem: Infection  Goal: Absence of Infection Signs and Symptoms  Outcome: Progressing    Pt A/O x 4, VSS besides a couple elevated BP readings, last one at 1600 was 162/110. Pt reports a headache, ear pain affecting mainly the left ear - pain managed with prn Oxycodone 5 mg. Double lumen PICC line in right arm saline locked. On cefipime and vanco. SBA.

## 2023-01-05 NOTE — PROGRESS NOTES
"[facility] ACUTE PAIN SERVICE    Daily PAIN Progress Note    Assessment/Plan:  Mey Dixon is a 37 year old female who was admitted on 1/2/2023.  Pain team was asked to see the patient for intractable migraine. Admitted for sepsis due to pyelonephritis. History of lupus s/p right renal transplant (2019), migraine headache, and frontal headache. Per resident, patient usually gets injections, follows with [facility]. Reports has not had relief in the past with triptans, APAP, or other migraine medications in the past. She reports only minor relief with lidocaine patch,Tylenol, Tizanidine, rizatriptan, IV Dilaudid 0.5mg.    Opioid Induced Respiratory Depression Risk Assessment:?  Moderate   In 24 hours has used 10mg of oxycodone for MME = 15.    Patient endorses signficant ear pain / pounding / pressure. Says that current migraine stems from this. She has never experienced this ear issue before or headache from an ear issue. Patient quite frustrated that we 'cant figure  Out what is going on or fix it'. She states oxycodone 'barely' works. \"not worth it\". I    Patient understands that opioids are not recommended long term here.   Will page resident Dr. Weeks to discuss.    Addendum 1500: revisited patient, she continues to express frustration, offer multiple changes to regimen and she declines all.  Integrative services very beneficial for patient this afternoon.     PLAN:   1) Pain is consistent with migraine headache/pressure, left ear pressure/congestion, low back pain. Labs and imaging indicated: I have personally reviewed pertinent labs, tests, and radiologic imaging in patient's chart. Treatment plan includes multimodal pain approach, Hospital Medicine Service for medical management. Patient educated regarding multimodal pain approach, medications as listed below. Patient is understanding of the plan. All questions and concerns addressed to patient's satisfaction.   2)Multimodal Medication " "Therapy  Topical: lidocaine ointment 5% qid, diclofenac gel 1% qid to back. Consider nasal decongestant spray such as  Oxymetazoline bid x2-3 days only (rhonda does report \"I can breathe through my nose, but it does feel congested\" - defer to [facility]. Could also consider compounded nasal lidocaine spray for headache.  On Flonase currently per Hospital Medicine Service.   NSAID'S: CrCl 71 mL/min. Avoid given h/o right renal transplant (2019).   Steroids: PTA prednisone 5mg daily   Muscle Relaxants: discontinue tizanidine - not reporting spasms   Adjuvants: APAP 650mg q6h, gabapentin 300 mg po daily prn neuropathic pain, headache (reports she takes Gabapentin at home once daily 2-3x monthly), prn Fioricet while inpatient, 1x dose Maxalt per Hospital Medicine Service   Antidepressants/anxiolytics: PTA trazodone 100mg hs   Opioids: oxycodone 5mg q6h prn   IV Pain medication: none  3)Non-medication interventions: ice vs heat  Acupuncture consult - declines   Integrative consult - agreeable  4)Constipation Prophylaxis:  Senna/docusate 1 po bid prn  5) Care Teams: [facility], Nephrology     -[place]  pulled from system on 1/4/2023 but no results found.      Discharge Recommendations - We recommend prescribing the following at the time of discharge: Will evaluate if there is need for opioid, if so 1-3 day supply only, topicals if helpful, APAP.     Principal Problem:    Sepsis (H)  Active Problems:    Pyelonephritis, acute    Sinus tachycardia    Avascular necrosis of femoral head, unspecified laterality (H)    Lupus erythematosus      Objective:  Vital signs in last 24 hours:  /81 (BP Location: Left arm, Patient Position: Supine, Cuff Size: Adult Regular)   Pulse 89   Temp 97.5  F (36.4  C) (Oral)   Resp 16   Ht 1.638 m (5' 4.5\")   Wt 103 kg (227 lb 1.2 oz)   SpO2 98%   BMI 38.38 kg/m    Weight:   Vitals:    01/02/23 1521 01/04/23 0427 01/05/23 0410   Weight: 95.3 kg (210 lb) 100.7 kg (222 lb 1.6 oz) 103 kg (227 lb 1.2 oz) "      Weight change: 2.256 kg (4 lb 15.6 oz)  Body mass index is 38.38 kg/m .  Labs: Reviewed I have personally reviewed pertinent labs, tests, and radiologic imaging in patient's chart.  Recent Results (from the past 24 hour(s))   CBC with platelets    Collection Time: 01/04/23 10:54 AM   Result Value Ref Range    WBC Count 9.4 4.0 - 11.0 10e3/uL    RBC Count 2.95 (L) 3.80 - 5.20 10e6/uL    Hemoglobin 8.3 (L) 11.7 - 15.7 g/dL    Hematocrit 27.3 (L) 35.0 - 47.0 %    MCV 93 78 - 100 fL    MCH 28.1 26.5 - 33.0 pg    MCHC 30.4 (L) 31.5 - 36.5 g/dL    RDW 15.2 (H) 10.0 - 15.0 %    Platelet Count 110 (L) 150 - 450 10e3/uL   CBC with platelets    Collection Time: 01/05/23  6:13 AM   Result Value Ref Range    WBC Count 7.3 4.0 - 11.0 10e3/uL    RBC Count 3.04 (L) 3.80 - 5.20 10e6/uL    Hemoglobin 8.4 (L) 11.7 - 15.7 g/dL    Hematocrit 28.0 (L) 35.0 - 47.0 %    MCV 92 78 - 100 fL    MCH 27.6 26.5 - 33.0 pg    MCHC 30.0 (L) 31.5 - 36.5 g/dL    RDW 15.1 (H) 10.0 - 15.0 %    Platelet Count 117 (L) 150 - 450 10e3/uL   Basic metabolic panel    Collection Time: 01/05/23  6:13 AM   Result Value Ref Range    Sodium 140 136 - 145 mmol/L    Potassium 4.1 3.5 - 5.0 mmol/L    Chloride 115 (H) 98 - 107 mmol/L    Carbon Dioxide (CO2) 20 (L) 22 - 31 mmol/L    Anion Gap 5 5 - 18 mmol/L    Urea Nitrogen 26 (H) 8 - 22 mg/dL    Creatinine 1.28 (H) 0.60 - 1.10 mg/dL    Calcium 8.2 (L) 8.5 - 10.5 mg/dL    Glucose 117 70 - 125 mg/dL    GFR Estimate 55 (L) >60 mL/min/1.73m2       Autumn Carreon, Cecile  Acute Care Pain Management Program  [facility] ([place], [place], [place])  Monday-Friday 8a-4p   Page via online paging system or call [phone]

## 2023-01-05 NOTE — PROGRESS NOTES
Critical Tacrolimus lab result of 25.2 reported at 1405. Christopher Weeks DO notified.     Leonor Saul RN

## 2023-01-05 NOTE — PLAN OF CARE
Problem: Plan of Care - These are the overarching goals to be used throughout the patient stay.    Goal: Readiness for Transition of Care  Outcome: Progressing     Problem: Infection  Goal: Absence of Infection Signs and Symptoms  Outcome: Progressing     A&Ox4, flat affect. Reports headache and back pain, managed with oxycodone PRN and tylenol. NS running at 75mL/hr. On cefipime and vanco. Stby assist.

## 2023-01-06 VITALS
HEART RATE: 80 BPM | SYSTOLIC BLOOD PRESSURE: 140 MMHG | RESPIRATION RATE: 16 BRPM | OXYGEN SATURATION: 96 % | TEMPERATURE: 98 F | WEIGHT: 227.07 LBS | BODY MASS INDEX: 37.83 KG/M2 | HEIGHT: 65 IN | DIASTOLIC BLOOD PRESSURE: 90 MMHG

## 2023-01-06 LAB
ANION GAP SERPL CALCULATED.3IONS-SCNC: 8 MMOL/L (ref 5–18)
BUN SERPL-MCNC: 22 MG/DL (ref 8–22)
CALCIUM SERPL-MCNC: 8.7 MG/DL (ref 8.5–10.5)
CHLORIDE BLD-SCNC: 111 MMOL/L (ref 98–107)
CO2 SERPL-SCNC: 22 MMOL/L (ref 22–31)
CREAT SERPL-MCNC: 1.31 MG/DL (ref 0.6–1.1)
ERYTHROCYTE [DISTWIDTH] IN BLOOD BY AUTOMATED COUNT: 14.7 % (ref 10–15)
GFR SERPL CREATININE-BSD FRML MDRD: 54 ML/MIN/1.73M2
GLUCOSE BLD-MCNC: 86 MG/DL (ref 70–125)
HCT VFR BLD AUTO: 28.6 % (ref 35–47)
HGB BLD-MCNC: 8.9 G/DL (ref 11.7–15.7)
MCH RBC QN AUTO: 28.1 PG (ref 26.5–33)
MCHC RBC AUTO-ENTMCNC: 31.1 G/DL (ref 31.5–36.5)
MCV RBC AUTO: 90 FL (ref 78–100)
PLATELET # BLD AUTO: 125 10E3/UL (ref 150–450)
POTASSIUM BLD-SCNC: 3.8 MMOL/L (ref 3.5–5)
RBC # BLD AUTO: 3.17 10E6/UL (ref 3.8–5.2)
SODIUM SERPL-SCNC: 141 MMOL/L (ref 136–145)
WBC # BLD AUTO: 5.4 10E3/UL (ref 4–11)

## 2023-01-06 PROCEDURE — 85027 COMPLETE CBC AUTOMATED: CPT

## 2023-01-06 PROCEDURE — 80048 BASIC METABOLIC PNL TOTAL CA: CPT

## 2023-01-06 PROCEDURE — 250N000013 HC RX MED GY IP 250 OP 250 PS 637: Performed by: NURSE PRACTITIONER

## 2023-01-06 PROCEDURE — 250N000011 HC RX IP 250 OP 636: Performed by: STUDENT IN AN ORGANIZED HEALTH CARE EDUCATION/TRAINING PROGRAM

## 2023-01-06 PROCEDURE — 250N000013 HC RX MED GY IP 250 OP 250 PS 637: Performed by: STUDENT IN AN ORGANIZED HEALTH CARE EDUCATION/TRAINING PROGRAM

## 2023-01-06 PROCEDURE — 99232 SBSQ HOSP IP/OBS MODERATE 35: CPT | Performed by: PHYSICIAN ASSISTANT

## 2023-01-06 PROCEDURE — 250N000012 HC RX MED GY IP 250 OP 636 PS 637

## 2023-01-06 PROCEDURE — 250N000013 HC RX MED GY IP 250 OP 250 PS 637

## 2023-01-06 PROCEDURE — 99238 HOSP IP/OBS DSCHRG MGMT 30/<: CPT | Mod: GC

## 2023-01-06 RX ORDER — CEFDINIR 300 MG/1
300 CAPSULE ORAL
Qty: 13 CAPSULE | Refills: 0 | Status: SHIPPED | OUTPATIENT
Start: 2023-01-06

## 2023-01-06 RX ORDER — OXYCODONE HYDROCHLORIDE 5 MG/1
5 TABLET ORAL EVERY 6 HOURS PRN
Qty: 5 TABLET | Refills: 0 | Status: SHIPPED | OUTPATIENT
Start: 2023-01-06

## 2023-01-06 RX ORDER — CEFDINIR 300 MG/1
300 CAPSULE ORAL
Status: DISCONTINUED | OUTPATIENT
Start: 2023-01-06 | End: 2023-01-06 | Stop reason: HOSPADM

## 2023-01-06 RX ORDER — CEFDINIR 300 MG/1
300 CAPSULE ORAL 2 TIMES DAILY
Qty: 15 CAPSULE | Refills: 0 | Status: CANCELLED | OUTPATIENT
Start: 2023-01-06

## 2023-01-06 RX ADMIN — SODIUM BICARBONATE 650 MG TABLET 650 MG: at 09:34

## 2023-01-06 RX ADMIN — LIDOCAINE: 50 OINTMENT TOPICAL at 13:10

## 2023-01-06 RX ADMIN — CEFEPIME 2 G: 2 INJECTION, POWDER, FOR SOLUTION INTRAVENOUS at 09:34

## 2023-01-06 RX ADMIN — BUTALBITAL, ACETAMINOPHEN, AND CAFFEINE 1 TABLET: 50; 325; 40 TABLET ORAL at 12:06

## 2023-01-06 RX ADMIN — OXYCODONE HYDROCHLORIDE 5 MG: 5 TABLET ORAL at 10:20

## 2023-01-06 RX ADMIN — LIDOCAINE: 50 OINTMENT TOPICAL at 09:36

## 2023-01-06 RX ADMIN — Medication 75 MG: at 09:34

## 2023-01-06 RX ADMIN — ACETAMINOPHEN 650 MG: 325 TABLET ORAL at 05:27

## 2023-01-06 RX ADMIN — PREDNISONE 5 MG: 5 TABLET ORAL at 09:34

## 2023-01-06 RX ADMIN — FLUTICASONE PROPIONATE 1 SPRAY: 50 SPRAY, METERED NASAL at 09:37

## 2023-01-06 RX ADMIN — DICLOFENAC SODIUM 2 G: 10 GEL TOPICAL at 11:41

## 2023-01-06 RX ADMIN — SULFAMETHOXAZOLE AND TRIMETHOPRIM 1 TABLET: 400; 80 TABLET ORAL at 09:36

## 2023-01-06 RX ADMIN — HYDROXYZINE HYDROCHLORIDE 50 MG: 25 TABLET ORAL at 05:27

## 2023-01-06 RX ADMIN — CHOLECALCIFEROL TAB 25 MCG (1000 UNIT) 25 MCG: 25 TAB at 09:36

## 2023-01-06 RX ADMIN — OXYCODONE HYDROCHLORIDE 5 MG: 5 TABLET ORAL at 00:33

## 2023-01-06 RX ADMIN — DICLOFENAC SODIUM 2 G: 10 GEL TOPICAL at 06:54

## 2023-01-06 ASSESSMENT — ACTIVITIES OF DAILY LIVING (ADL)
ADLS_ACUITY_SCORE: 20

## 2023-01-06 NOTE — PLAN OF CARE
Goal Outcome Evaluation:      Plan of Care Reviewed With: patient    Overall Patient Progress: improving    Alert and oriented x4, drowsy at times, able to make needs known, elevated BP, but asymptomatic, c/o head/ear pain, gave oxy x 1 with good relief, also gave atarax with tylenol for good relief, PICC line in place, blood return noted in both lumen, lab drawn, independent in room.

## 2023-01-06 NOTE — PROGRESS NOTES
"[facility] ACUTE PAIN SERVICE    Chart Check    Assessment/Plan:  Mey Dixon is a 37 year old female who was admitted on 1/2/2023.  Pain team was asked to see the patient for intractable migraine. Admitted for sepsis due to pyelonephritis. History of lupus s/p right renal transplant (2019), migraine headache, and frontal headache. Per resident, patient usually gets injections, follows with [facility]. Reports has not had relief in the past with triptans, APAP, or other migraine medications in the past.   Opioid Induced Respiratory Depression Risk Assessment:?  Moderate   In 24 hours has used 10mg of oxycodone for MME = 15.  Discussed with MD Weeks. Will discharge today, on low dose low amount oxycodone. Patient is uninterested in continuing other multimodal approaches we have tried, can go back to home medication regimen at discharge. Will sign off, please reconsult should pain situation change.        PLAN:   1) Pain is consistent with migraine headache/pressure, left ear pressure/congestion, low back pain. Labs and imaging indicated: I have personally reviewed pertinent labs, tests, and radiologic imaging in patient's chart. Treatment plan includes multimodal pain approach, Hospital Medicine Service for medical management. Patient educated regarding multimodal pain approach, medications as listed below. Patient is understanding of the plan. All questions and concerns addressed to patient's satisfaction.   2)Multimodal Medication Therapy  Topical: lidocaine ointment 5% qid, diclofenac gel 1% qid to back. Consider nasal decongestant spray such as  Oxymetazoline bid x2-3 days only (paient does report \"I can breathe through my nose, but it does feel congested\" - [place] [facility] Could also consider compounded nasal lidocaine spray for headache.  On Flonase currently per Hospital Medicine Service.   NSAID'S: CrCl 71 mL/min. Avoid given h/o right renal transplant (2019).   Steroids: PTA prednisone 5mg " "daily   Muscle Relaxants: discontinue tizanidine - not reporting spasms   Adjuvants: APAP 650mg q6h, gabapentin 300 mg po daily prn neuropathic pain, headache (reports she takes Gabapentin at home once daily 2-3x monthly), prn Fioricet while inpatient, 1x dose Maxalt per Hospital Medicine Service   Antidepressants/anxiolytics: PTA trazodone 100mg hs   Opioids: oxycodone 5mg q6h prn   IV Pain medication: none  3)Non-medication interventions: ice vs heat  Acupuncture consult - declines   Integrative consult - agreeable  4)Constipation Prophylaxis:  Senna/docusate 1 po bid prn  5) Care Teams: [facility] [place]rology     -[place]  pulled from system on 1/4/2023 but no results found.      Discharge Recommendations - We recommend prescribing the following at the time of discharge: Will evaluate if there is need for opioid, if so 1-3 day supply only, topicals if helpful, APAP.     Principal Problem:    Sepsis (H)  Active Problems:    Pyelonephritis, acute    Sinus tachycardia    Avascular necrosis of femoral head, unspecified laterality (H)    Lupus erythematosus      Objective:  Vital signs in last 24 hours:  BP (!) 162/100 (BP Location: Left arm)   Pulse 89   Temp 98.9  F (37.2  C) (Oral)   Resp 16   Ht 1.638 m (5' 4.5\")   Wt 103 kg (227 lb 1.2 oz)   SpO2 97%   BMI 38.38 kg/m    Weight:     Vitals:    01/02/23 1521 01/04/23 0427 01/05/23 0410   Weight: 95.3 kg (210 lb) 100.7 kg (222 lb 1.6 oz) 103 kg (227 lb 1.2 oz)      Weight change:   Body mass index is 38.38 kg/m .  Labs: Reviewed I have personally reviewed pertinent labs, tests, and radiologic imaging in patient's chart.  Recent Results (from the past 24 hour(s))   CBC with platelets    Collection Time: 01/06/23  5:38 AM   Result Value Ref Range    WBC Count 5.4 4.0 - 11.0 10e3/uL    RBC Count 3.17 (L) 3.80 - 5.20 10e6/uL    Hemoglobin 8.9 (L) 11.7 - 15.7 g/dL    Hematocrit 28.6 (L) 35.0 - 47.0 %    MCV 90 78 - 100 fL    MCH 28.1 26.5 - 33.0 pg    MCHC 31.1 (L) 31.5 - " 36.5 g/dL    RDW 14.7 10.0 - 15.0 %    Platelet Count 125 (L) 150 - 450 10e3/uL   Basic metabolic panel    Collection Time: 01/06/23  5:38 AM   Result Value Ref Range    Sodium 141 136 - 145 mmol/L    Potassium 3.8 3.5 - 5.0 mmol/L    Chloride 111 (H) 98 - 107 mmol/L    Carbon Dioxide (CO2) 22 22 - 31 mmol/L    Anion Gap 8 5 - 18 mmol/L    Urea Nitrogen 22 8 - 22 mg/dL    Creatinine 1.31 (H) 0.60 - 1.10 mg/dL    Calcium 8.7 8.5 - 10.5 mg/dL    Glucose 86 70 - 125 mg/dL    GFR Estimate 54 (L) >60 mL/min/1.73m2       Irma MunguiaD  Acute Care Pain Management Program  [facility] ([facility], [place], [place])  Monday-Friday 8a-4p   Page via online paging system or call [phone]

## 2023-01-06 NOTE — DISCHARGE SUMMARY
[facility]  Discharge Summary - Medicine & Pediatrics       Date of Admission:  1/2/2023  Date of Discharge:  1/6/2023  2:00 PM  Discharging Provider: Christopher Weeks DO       Attending Physician: Mable Gallo MD  Discharge Service: Hospitalist Service    Discharge Diagnoses   Pyelonephritis  Acute Kidney Injury  Migraine Headache    Follow-ups Needed After Discharge   Follow-up Appointments     Follow-up and recommended labs and tests       Follow up with your nephrologist and rheumatologist. They will reassess   your immunosuppression medications.    Follow up with your primary care provider to discuss migraine medications.   Your headache should improve as your Tacrolimus levels decrease.             Unresulted Labs Ordered in the Past 30 Days of this Admission     Date and Time Order Name Status Description    1/2/2023  6:38 PM Blood Culture Peripheral Blood Preliminary     1/2/2023  6:38 PM Blood Culture Peripheral Blood Preliminary       These results will be followed up by by your primary care physician at your hospital follow up.    Discharge Disposition   Discharged to home  Condition at discharge: Stable    Hospital Course   Mey Dixon was admitted on 1/2/2023 for pyelonephritis.  She has a history of renal transplant and follows with acute nephrology consultants.  She presented to the hospital with chills, fevers, body aches that were worsening for 4 days, as well as nausea and vomiting and dysuria.  She has been unable to take her immunosuppressive medication during this time.  She was found to be afebrile and tachycardic in the ED with an KECIA, leukocytosis, and UA consistent with infection.  CT abdomen showed nonobstructing stone of the transplanted kidney with trace perinephric stranding.  Patient was started on cefepime and Zosyn and nephrology was consulted.  Blood cultures were drawn and found to be NGTD urine cultures showed mixed urogenital catrachita.  Nephrology  recommended continued IV fluid and supportive cares, and restarted her immunosuppressants and margie a tacrolimus level to determine if she was being therapeutically treated.      Her KECIA, back pain, and abdominal pain improved and she was no longer vomiting and had a return of her urine output.  However, she began to complain of a constant, intractable migraine with bilateral frontal sinus tenderness and bilateral ear pain.  Attempted to treat with Tylenol and triptan therapy however this was ineffective and the patient stated that the only thing that would work for her would be IV Dilaudid.  This was given to her in a one-time dose and the patient reported improvement of her pain.  Pain team was consulted, and they created a multimodal plan for her pain however she continued to request opiate medications.  No evidence of otitis media or sinusitis or any upper respiratory infection throughout multiple days of physical exam.  Patient had 1 episode where she was aggressive and swearing at staff for attempting to give her Tylenol as she stated that this had never worked.  This episode resolved without incident.  Tacrolimus level resulted on the day of discharge and she was found to be very supratherapeutic, and it was believed that her headache was due to high levels of tacrolimus.  Her tacrolimus was decreased from 7 mg to 4 mg daily on the day of discharge, and she was transitioned to p.o. cefdinir for an additional 7 days.  Was provided with 5 tablets of oxycodone 5 mg p.o. on discharge for her intractable headache.  She was discharged in stable condition and counseled to follow-up with her primary care provider as well as her nephrologist/rheumatologist for readjustment of her immunosuppressive medications.      Consultations This Hospital Stay   PHARMACY TO DOSE [facility]  VASCULAR ACCESS ADULT IP CONSULT  PHARMACY TO DOSE [facility]  NEPHROLOGY IP CONSULT  CARE MANAGEMENT / SOCIAL WORK IP CONSULT  PAIN MANAGEMENT ADULT IP  CONSULT  PAIN MANAGEMENT ADULT IP CONSULT  INTEGRATIVE THERAPIES CONSULT    Code Status   Prior       The patient was discussed with the Attending Physician, Dr. Mable Gallo.    Christopher Weeks [facility]  [address] [address]  Phone: [phone]  Fax: [phone]  ______________________________________________________________________    Physical Exam   Vital Signs: Temp: 98  F (36.7  C) Temp src: Oral BP: (!) 140/90 Pulse: 80   Resp: 16 SpO2: 96 % O2 Device: None (Room air)    Weight: 227 lbs 1.18 oz  Constitutional: Awake, alert, cooperative, no apparent distress, and appears stated age.  Eyes: Lids and lashes normal, extra ocular muscles intact, sclera clear, conjunctiva normal.  ENT: Normocephalic, atraumatic. Moist mucous membranes.  No sinus tenderness to percussion.  TMs appear normal with normal cone of light visualized and no effusion bilaterally.  External canal nonerythematous bilaterally.  Nares nonedematous and without rhinorrhea bilaterally.  Respiratory: No increased work of breathing, no accessory muscle use, clear to auscultation bilaterally, no crackles or wheezing.  Cardiovascular: Regular rate and rhythm, normal S1 and S2, no S3 or S4, and no murmur noted  GI: Abdomen soft, non-tender, non-distended, no masses palpated. Bowel sounds present.  Skin: No bruising or bleeding and normal skin color, texture, turgor.  Musculoskeletal: There is no redness, warmth, or swelling of the joints.  Full range of motion noted.  Motor strength is 5 out of 5 all extremities bilaterally.  Tone is normal.  Neurologic: Awake, alert, oriented to name, place and time.  Cranial nerves II-XII are grossly intact.  Sensation intact bilaterally.        Primary Care Physician   Nidia Fuentes    Discharge Orders      Reason for your hospital stay    Pyelonephritis     Follow-up and recommended labs and tests      Follow up with your nephrologist and rheumatologist. They will reassess your immunosuppression medications.    Follow up with your primary care provider to discuss migraine medications. Your headache should improve as your Tacrolimus levels decrease.     Activity    Your activity upon discharge: activity as tolerated     Diet    Follow this diet upon discharge: Orders Placed This Encounter      Renal Diet (non-dialysis)       Significant Results and Procedures   Most Recent 3 CBC's:Recent Labs   Lab Test 01/06/23  0538 01/05/23  0613 01/04/23  1054   WBC 5.4 7.3 9.4   HGB 8.9* 8.4* 8.3*   MCV 90 92 93   * 117* 110*     Most Recent 3 BMP's:Recent Labs   Lab Test 01/06/23  0538 01/05/23  0613 01/04/23  0457    140 136   POTASSIUM 3.8 4.1 4.1   CHLORIDE 111* 115* 113*   CO2 22 20* 18*   BUN 22 26* 29*   CR 1.31* 1.28* 1.43*   ANIONGAP 8 5 5   JUNI 8.7 8.2* 8.0*   GLC 86 117 108   ,   Results for orders placed or performed during the hospital encounter of 01/02/23   Chest XR,  PA & LAT    Narrative    EXAM: XR CHEST 2 VIEWS  LOCATION: [facility]  DATE/TIME: 01/02/2023, 4:31 PM    INDICATION: Cough, fever.  COMPARISON: Chest x-ray 09/30/2022.      Impression    IMPRESSION: Negative chest.     CT Abdomen Pelvis w/o Contrast    Narrative    EXAM: CT ABDOMEN PELVIS W/O CONTRAST  LOCATION: [facility]  DATE/TIME: 1/2/2023 7:07 PM    INDICATION: fever, UTI, transplanted kidney  COMPARISON: 9/30/2022  TECHNIQUE: CT scan of the abdomen and pelvis was performed without IV contrast. Multiplanar reformats were obtained. Dose reduction techniques were used.  CONTRAST: None.    FINDINGS:   LOWER CHEST: Normal.    HEPATOBILIARY: Hepatic steatosis.    PANCREAS: Normal.    SPLEEN: Normal.    ADRENAL GLANDS: Normal.    NATIVE KIDNEYS/BLADDER: The kidneys are atrophic. A simple cyst lower pole right kidney, no follow-up required. No hydronephrosis.    TRANSPLANT KIDNEY: A  right pelvic transplant kidney. There are 3 stable adjacent stones in the upper pole with the largest measuring 0.6 cm. No hydronephrosis. Trace perinephric fat stranding.    BOWEL: No obstruction or inflammatory change. Normal appendix.    LYMPH NODES: Normal.    VASCULATURE: Unremarkable.    PELVIC ORGANS: Normal.    MUSCULOSKELETAL: Stable mild subchondral sclerosis in the femoral head is consistent with avascular necrosis.      Impression    IMPRESSION:   1.  No acute abnormality.  2.  Nephrolithiasis of the transplant kidney.  3.  Hepatic steatosis.  4.  Avascular necrosis of the femoral heads.     Head CT w/o contrast    Narrative    EXAM: CT HEAD W/O CONTRAST  LOCATION: [facility]  DATE/TIME: 1/2/2023 7:02 PM    INDICATION: fever, headache, immusupprsed  COMPARISON: 09/30/2022.  TECHNIQUE: Routine CT Head without IV contrast. Multiplanar reformats. Dose reduction techniques were used.    FINDINGS:  INTRACRANIAL CONTENTS: No intracranial hemorrhage, extraaxial collection, or mass effect.  No CT evidence of acute infarct. Normal parenchymal attenuation. Normal ventricles and sulci.     VISUALIZED ORBITS/SINUSES/MASTOIDS: No intraorbital abnormality. No paranasal sinus mucosal disease. No middle ear or mastoid effusion.    BONES/SOFT TISSUES: No acute abnormality.      Impression    IMPRESSION:  1.  No CT finding of a mass, hemorrhage or focal area suggestive of acute infarct.       Discharge Medications   Discharge Medication List as of 1/6/2023  1:17 PM      START taking these medications    Details   cefdinir (OMNICEF) 300 MG capsule Take 1 capsule (300 mg) by mouth 2 times daily (before meals), Disp-13 capsule, R-0, E-Prescribe      oxyCODONE (ROXICODONE) 5 MG tablet Take 1 tablet (5 mg) by mouth every 6 hours as needed for severe pain (7-10), Disp-5 tablet, R-0, E-Prescribe         CONTINUE these medications which have CHANGED    Details   tacrolimus (ENVARSUS XR) 4 MG 24 hr tablet  Take 1 tablet (4 mg) by mouth every morning (before breakfast), Disp-30 tablet, R-0, E-Prescribe         CONTINUE these medications which have NOT CHANGED    Details   azaTHIOprine (IMURAN) 50 MG tablet Take 75 mg by mouth daily, Historical      CALCIUM ANTACID 500 MG chewable tablet Take 3,000 mg by mouth nightly as needed, TOMMY, Historical      diphenhydrAMINE-acetaminophen (TYLENOL PM)  MG tablet Take 2 tablets by mouth nightly as needed for sleep, Historical      doxycycline hyclate (VIBRA-TABS) 100 MG tablet Take 100 mg by mouth 2 times daily, Historical      ondansetron (ZOFRAN ODT) 4 MG ODT tab Take 4 mg by mouth every 6 hours as needed, Historical      pantoprazole (PROTONIX) 40 MG EC tablet Take 40 mg by mouth At Bedtime, Historical      predniSONE (DELTASONE) 5 MG tablet Take 5 mg by mouth daily, Historical      rosuvastatin (CRESTOR) 20 MG tablet Take 20 mg by mouth At Bedtime, Historical      sodium bicarbonate 650 MG tablet Take 650 mg by mouth 2 times daily, Historical      traZODone (DESYREL) 50 MG tablet Take 100 mg by mouth At Bedtime daily, Historical      TRI-SPRINTEC 0.18/0.215/0.25 MG-35 MCG tablet Take 1 tablet by mouth daily, TOMMY, Historical      VITAMIN D3 25 MCG (1000 UT) tablet Take 25 mcg by mouth 2 times daily, TOMMY, Historical      sulfamethoxazole-trimethoprim (BACTRIM) 400-80 MG tablet Take 1 tablet by mouth three times a week MWF, Historical           Allergies   Allergies   Allergen Reactions     Cellcept [Mycophenolate] Diarrhea and GI Disturbance

## 2023-01-06 NOTE — PLAN OF CARE
Goal Outcome Evaluation:  Pt is A&O x4. VSS. PICC removed per MD order. Discharge instructions and education provided to pt. Pt acknowledge understanding and had no questions for nurse. Pt is being discharge to home with family transport.

## 2023-01-06 NOTE — PROGRESS NOTES
RENAL PROGRESS NOTE    CC:  Fever, nausea, vomiting     ASSESSMENT & PLAN:     KECIA - Baseline ESRD on dialysis for about two years prior to renal transplant in 2019. Baseline Cr appears to be 1.5-1.7, last labs almost one year ago Jan 2022. Cr peaked at 2.4, improved to 1.9 with IVF suggesting prerenal etiology with poor oral intakes and nausea and pyelonephritis. Less likely to be an acute rejection, not yet seeing signs of CNI toxicity despite supratherapeutic Tac levels. Recommend supportive cares and plan for expectant management. [place] [facility] [place], they will schedule follow up labs next week and visit with transplant nephrologist in coming weeks.      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 Follows with our service [place] [facility] [place]. Immunosuppression as below.      Chronic immunosuppression - On Azathioprine 75 mg daily, prednisone 5 mg, and tacrolimus XR 7 mg QAM. On Bactrim MWF for PJP prophylaxis. Had some missed/delayed doses with illness, nausea prior to admission. Tacrolimus level 1/5 supratherapeutic at 25.2 (goal 5-10). Decreased tacrolimus XR to 4 mg daily and plan to follow levels as outpatient.      Pyelonephritis - Febrile with leukocytosis, turbid urine with microhematuria, pyuria, proteinuria, and pending UCx, BCx NGTD. On cefepime and Vancomycin per primary service and plans to transition to oral cefdinir for discharge.      Metabolic acidosis - Mild, continue oral bicarbonate for now     Migraine - Chronic migraine headaches and follows with neurology outpatient for Botox injections. Severe this admission and not responding to typical treatments. Pain service now following. Recommend complete NSAID abstinence.      SLE - complicated by lupus nephritis as above. On tacrolimus, prednisone, and AZA. Follows with rheumatology as an outpatient.      Hyperlipidemia - on statin     SUBJECTIVE: Seen at bedside. Pain is better  today, rating 4-5/10. Is feeling ok about discharge, discussed plan for close follow up with transplant nephrology next week.     OBJECTIVE:  Physical Exam   Temp: 98  F (36.7  C) Temp src: Oral BP: (!) 140/90 Pulse: 80   Resp: 16 SpO2: 96 % O2 Device: None (Room air)    Vitals:    01/02/23 1521 01/04/23 0427 01/05/23 0410   Weight: 95.3 kg (210 lb) 100.7 kg (222 lb 1.6 oz) 103 kg (227 lb 1.2 oz)     Vital Signs with Ranges  Temp:  [98  F (36.7  C)-99.1  F (37.3  C)] 98  F (36.7  C)  Pulse:  [80-90] 80  Resp:  [16-18] 16  BP: (140-162)/() 140/90  SpO2:  [96 %-99 %] 96 %  No intake/output data recorded.    @TMAXR(24)@    Patient Vitals for the past 72 hrs:   Weight   01/05/23 0410 103 kg (227 lb 1.2 oz)   01/04/23 0427 100.7 kg (222 lb 1.6 oz)       Intake/Output Summary (Last 24 hours) at 1/4/2023 1036  Last data filed at 1/4/2023 0800  Gross per 24 hour   Intake 2466 ml   Output 1150 ml   Net 1316 ml       PHYSICAL EXAM:  General - Alert and oriented x3, NAD, fatigued appearing   Cardiovascular - Regular rate and rhythm, no rub  Respiratory - Clear to auscultation bilaterally, no crackles or wheezes  Abd: BS present, no guarding or pain with palpation, no ascites  Extremities - No lower extremity edema bilaterally  Skin: dry, intact, no rash, good turgor  Neuro:  Grossly intact, no focal deficits  MSK:  Grossly intact  Psych:  Flat affect    LABORATORY STUDIES:     Recent Labs   Lab 01/06/23  0538 01/05/23  0613 01/04/23  1054 01/03/23  0706 01/02/23  1607   WBC 5.4 7.3 9.4 12.6* 14.6*   RBC 3.17* 3.04* 2.95* 3.16* 3.97   HGB 8.9* 8.4* 8.3* 9.0* 11.1*   HCT 28.6* 28.0* 27.3* 28.8* 35.3   * 117* 110* 138* 202       Basic Metabolic Panel:  Recent Labs   Lab 01/06/23  0538 01/05/23  0613 01/04/23  0457 01/03/23  0706 01/02/23  2113 01/02/23  1607    140 136 139 137 134*   POTASSIUM 3.8 4.1 4.1 4.1 5.0 4.4   CHLORIDE 111* 115* 113* 112* 106 103   CO2 22 20* 18* 19* 20* 20*   BUN 22 26* 29* 38* 39*  35*   CR 1.31* 1.28* 1.43* 1.89* 2.37* 2.01*   GLC 86 117 108 100 101 116   JUNI 8.7 8.2* 8.0* 8.0* 9.1 10.1       INRNo lab results found in last 7 days.     Recent Labs   Lab Test 01/06/23  0538 01/05/23  0613   WBC 5.4 7.3   HGB 8.9* 8.4*   * 117*       Personally reviewed current labs      Leonor Patel PA-C   [facility]  [phone]

## 2023-01-06 NOTE — PLAN OF CARE
Problem: Plan of Care - These are the overarching goals to be used throughout the patient stay.    Goal: Optimal Comfort and Wellbeing  Outcome: Progressing  Intervention: Monitor Pain and Promote Comfort  Recent Flowsheet Documentation  Taken 1/6/2023 0906 by Leonor Wise RN  Pain Management Interventions: medication (see MAR)     Pt A/O x 4. Pt continues to have headache and bilateral ear pressure. Managing with prn medications. Elevated BP but asymptomatic. Pt tolerating oral intake and IV abx. VSS on RA. PICC line in place. Purple  lumen blood return noted flushing well. Red lumen no blood return and unable to flush. Oncoming nursing staff notified will contact MD. Pt able to make needs known. Independent in room. Discharge this afternoon boyfriend will transport.

## 2023-01-06 NOTE — PROGRESS NOTES
Care Management Discharge Note    Discharge Date: 01/06/2023       Discharge Disposition: Home    Discharge Services: None    Discharge DME: None    Discharge Transportation: family or friend will provide ([email or web address] Brennan)    Private pay costs discussed: Not applicable    PAS Confirmation Code:    Patient/family educated on Medicare website which has current facility and service quality ratings:      Education Provided on the Discharge Plan:    Persons Notified of Discharge Plans: pt  Patient/Family in Agreement with the Plan: yes    Handoff Referral Completed: Yes    Additional Information:  12:47 PM  Pt medically ready for discharge home with assist from boyfriend.  Boyfriend to transport home.        JAMES Millan

## 2023-01-07 LAB
BACTERIA BLD CULT: NO GROWTH
BACTERIA BLD CULT: NO GROWTH

## 2023-01-08 ENCOUNTER — NURSE TRIAGE (OUTPATIENT)
Dept: NURSING | Facility: CLINIC | Age: 38
End: 2023-01-08

## 2023-01-08 NOTE — TELEPHONE ENCOUNTER
Spouse Mika is calling and states that Mey had a picc line placed on Monday Jan 2nd.  Picc line is in her right arm and today the picc lines is very painful.  Patient tried icing and tylenol and Oxycodone and is not helping.  Patient cannot sleep on her arm and cannot lift her arm.  Patient denies severe difficulty breathing and denies too weak to stand and denies picc line is cracked or broken.  Denies bleeding around site.  Denies chest pain and denies chest or neck swelling.  Denies arm swelling and denies fever greater than 100.4.  Patient states that pain medication is not helping and bruising is present.  Patient states that she will to to ER.      Reason for Disposition    Patient sounds very sick or weak to the triager    Additional Information    Negative: SEVERE difficulty breathing (e.g., struggling for each breath, speaks in single words)    Negative: Shock suspected (e.g., cold/pale/clammy skin, too weak to stand, low BP, rapid pulse)    Negative: Cracked or broken central line or PICC Line    Negative: Sounds like a life-threatening emergency to the triager    Negative: [1] Difficulty breathing AND [2] not severe    Negative: Moderate bleeding around IV site  (Exception: Mild bleeding that stops quickly with direct pressure)    Negative: [1] Chest pain AND [2] has central or PICC line    Negative: [1] Chest or neck swelling AND [2] has a central or PICC line  (Exception: Mild puffiness or swelling just around IV site.)    Negative: [1] Arm or leg swelling AND [2] has a central or PICC line  (Exception: Mild puffiness or swelling just around IV site.)    Negative: Fever > 100.4 F (38.0 C)    Protocols used: IV SITE AND OTHER SYMPTOMS-A-AH

## 2023-01-09 ENCOUNTER — PATIENT OUTREACH (OUTPATIENT)
Dept: CARE COORDINATION | Facility: CLINIC | Age: 38
End: 2023-01-09

## 2023-01-09 ENCOUNTER — TELEPHONE (OUTPATIENT)
Dept: FAMILY MEDICINE | Facility: CLINIC | Age: 38
End: 2023-01-09

## 2023-01-09 NOTE — PROGRESS NOTES
[facility]    Background: Transitional Care Management program identified per system criteria and reviewed by [facility] team for possible outreach.    Assessment: Upon chart review, CCR Team member will not proceed with patient outreach related to this episode of Transitional Care Management program due to reason below:    Patient has active communication with a nurse, provider or care team for reason of post-hospital follow up plan.  Outreach call by CCR team not indicated to minimize duplicative efforts.     Plan: Transitional Care Management episode addressed appropriately per reason noted above.      Patricia Rodriguez  Community Health Worker  [facility] [place]  [phone]      *Connected Care Resource Team does NOT follow patient ongoing. Referrals are identified based on internal discharge reports and the outreach is to ensure patient has an understanding of their discharge instructions.

## 2023-01-09 NOTE — TELEPHONE ENCOUNTER
Received page from [facility] Charge RN regarding antibiotics. Per RN, [facility] called because pharmacy only had 3 days of Cefdinir they could give her (1/6-1/9). Called [facility] pharmacy, they have no third generation cephalosporins available. Other pharmacies are also out of supply. Keflex is available.  Verbally prescribed Keflex 500 mg TID for 4 additional days.     Ashlyn Smith MD PGY2  [facility]  01/09/23

## 2024-04-30 ENCOUNTER — APPOINTMENT (OUTPATIENT)
Dept: URBAN - METROPOLITAN AREA CLINIC 260 | Age: 39
Setting detail: DERMATOLOGY
End: 2024-04-30

## 2024-04-30 VITALS — WEIGHT: 213 LBS | RESPIRATION RATE: 14 BRPM | HEIGHT: 65 IN

## 2024-04-30 DIAGNOSIS — L73.8 OTHER SPECIFIED FOLLICULAR DISORDERS: ICD-10-CM

## 2024-04-30 DIAGNOSIS — L72.0 EPIDERMAL CYST: ICD-10-CM

## 2024-04-30 DIAGNOSIS — D22 MELANOCYTIC NEVI: ICD-10-CM

## 2024-04-30 PROBLEM — D22.39 MELANOCYTIC NEVI OF OTHER PARTS OF FACE: Status: ACTIVE | Noted: 2024-04-30

## 2024-04-30 PROCEDURE — OTHER ADDITIONAL NOTES: OTHER

## 2024-04-30 PROCEDURE — OTHER BENIGN DESTRUCTION COSMETIC: OTHER

## 2024-04-30 PROCEDURE — OTHER COSMETIC SHAVE REMOVAL: OTHER

## 2024-04-30 PROCEDURE — OTHER PRESCRIPTION: OTHER

## 2024-04-30 PROCEDURE — OTHER MIPS QUALITY: OTHER

## 2024-04-30 PROCEDURE — OTHER COUNSELING: OTHER

## 2024-04-30 RX ORDER — TRETIONIN 0.25 MG/G
CREAM TOPICAL
Qty: 45 | Refills: 3 | Status: ERX | COMMUNITY
Start: 2024-04-30

## 2024-04-30 ASSESSMENT — LOCATION DETAILED DESCRIPTION DERM
LOCATION DETAILED: LEFT INFERIOR MEDIAL MALAR CHEEK
LOCATION DETAILED: LEFT SUPERIOR LATERAL BUCCAL CHEEK
LOCATION DETAILED: RIGHT INFERIOR CENTRAL MALAR CHEEK
LOCATION DETAILED: RIGHT CENTRAL MALAR CHEEK
LOCATION DETAILED: LEFT CHIN
LOCATION DETAILED: RIGHT MEDIAL MALAR CHEEK
LOCATION DETAILED: LEFT MEDIAL EYEBROW
LOCATION DETAILED: LEFT INFERIOR LATERAL MALAR CHEEK
LOCATION DETAILED: LEFT SUPERIOR MEDIAL BUCCAL CHEEK

## 2024-04-30 ASSESSMENT — LOCATION ZONE DERM: LOCATION ZONE: FACE

## 2024-04-30 ASSESSMENT — LOCATION SIMPLE DESCRIPTION DERM
LOCATION SIMPLE: LEFT CHEEK
LOCATION SIMPLE: LEFT EYEBROW
LOCATION SIMPLE: CHIN
LOCATION SIMPLE: RIGHT CHEEK

## 2024-04-30 NOTE — PROCEDURE: COSMETIC SHAVE REMOVAL
Detail Level: Detailed
Price (Use Numbers Only, No Special Characters Or $): 200
X Size Of Lesion In Cm (Optional): 0
Biopsy Method: Dermablade
Anesthesia Type: 1% lidocaine with epinephrine
Hemostasis: Drysol
Wound Care: Petrolatum
Path Notes (To The Dermatopathologist): Please check margins.
Render Path Notes In Note?: No
Medical Necessity Clause: This procedure was medically necessary because the lesion that was treated was:
Consent was obtained from the patient. The risks and benefits to therapy were discussed in detail. Specifically, the risks of infection, scarring, bleeding, prolonged wound healing, incomplete removal, allergy to anesthesia, nerve injury and recurrence were addressed. Prior to the procedure, the treatment site was clearly identified and confirmed by the patient. All components of Universal Protocol/PAUSE Rule completed.
Post-Care Instructions: I reviewed with the patient in detail post-care instructions. Patient is to keep the biopsy site dry overnight, and then apply bacitracin twice daily until healed. Patient may apply hydrogen peroxide soaks to remove any crusting.
Notification Instructions: Patient will be notified of biopsy results. However, patient instructed to call the office if not contacted within 2 weeks.
Billing Type: Third-Party Bill
Price (Use Numbers Only, No Special Characters Or $): 100

## 2024-04-30 NOTE — HPI: SKIN LESION
Is This A New Presentation, Or A Follow-Up?: Skin Lesions
Additional History: She has a few flesh colored papules on the face that she is interested in removal of. She has had them forever. The one by her eye has gotten a bit bigger recently. \\nShe has small bumps on the face that arose within the last year.

## 2024-04-30 NOTE — PROCEDURE: ADDITIONAL NOTES
Additional Notes: Informed patient that these moles can be removed via a shave biopsy. Informed patient that removal would leave a scar and that these type of moles often recur.\\nThe patient elects removal today. \\nThe patient declined pathology.
Detail Level: Simple
Render Risk Assessment In Note?: no

## 2025-06-25 ENCOUNTER — APPOINTMENT (OUTPATIENT)
Dept: URBAN - METROPOLITAN AREA CLINIC 260 | Age: 40
Setting detail: DERMATOLOGY
End: 2025-06-25

## 2025-06-25 DIAGNOSIS — L73.8 OTHER SPECIFIED FOLLICULAR DISORDERS: ICD-10-CM

## 2025-06-25 DIAGNOSIS — D22 MELANOCYTIC NEVI: ICD-10-CM

## 2025-06-25 PROBLEM — D22.39 MELANOCYTIC NEVI OF OTHER PARTS OF FACE: Status: ACTIVE | Noted: 2025-06-25

## 2025-06-25 PROCEDURE — 99212 OFFICE O/P EST SF 10 MIN: CPT

## 2025-06-25 PROCEDURE — OTHER PRESCRIPTION MEDICATION MANAGEMENT: OTHER

## 2025-06-25 PROCEDURE — OTHER MIPS QUALITY: OTHER

## 2025-06-25 PROCEDURE — OTHER COSMETIC SHAVE REMOVAL (NO PATHOLOGY): OTHER

## 2025-06-25 PROCEDURE — OTHER COUNSELING: OTHER

## 2025-06-25 PROCEDURE — OTHER PRESCRIPTION: OTHER

## 2025-06-25 RX ORDER — TRETIONIN 0.25 MG/G
CREAM TOPICAL
Qty: 45 | Refills: 6 | Status: ERX

## 2025-06-25 ASSESSMENT — LOCATION DETAILED DESCRIPTION DERM
LOCATION DETAILED: LEFT INFERIOR CENTRAL MALAR CHEEK
LOCATION DETAILED: RIGHT CENTRAL BUCCAL CHEEK
LOCATION DETAILED: RIGHT FOREHEAD
LOCATION DETAILED: LEFT INFERIOR MEDIAL MALAR CHEEK
LOCATION DETAILED: SUBMENTAL CHIN

## 2025-06-25 ASSESSMENT — LOCATION SIMPLE DESCRIPTION DERM
LOCATION SIMPLE: RIGHT FOREHEAD
LOCATION SIMPLE: RIGHT CHEEK
LOCATION SIMPLE: LEFT CHEEK
LOCATION SIMPLE: SUBMENTAL CHIN

## 2025-06-25 ASSESSMENT — LOCATION ZONE DERM: LOCATION ZONE: FACE

## 2025-08-26 ENCOUNTER — OFFICE VISIT (OUTPATIENT)
Dept: NEUROLOGY | Facility: CLINIC | Age: 40
End: 2025-08-26
Payer: COMMERCIAL

## 2025-08-26 VITALS — SYSTOLIC BLOOD PRESSURE: 123 MMHG | HEART RATE: 89 BPM | DIASTOLIC BLOOD PRESSURE: 79 MMHG

## 2025-08-26 DIAGNOSIS — G43.709 CHRONIC MIGRAINE WITHOUT AURA WITHOUT STATUS MIGRAINOSUS, NOT INTRACTABLE: ICD-10-CM

## 2025-08-26 DIAGNOSIS — G89.29 CHRONIC NONINTRACTABLE HEADACHE, UNSPECIFIED HEADACHE TYPE: Primary | ICD-10-CM

## 2025-08-26 DIAGNOSIS — R51.9 CHRONIC NONINTRACTABLE HEADACHE, UNSPECIFIED HEADACHE TYPE: Primary | ICD-10-CM

## 2025-08-26 DIAGNOSIS — R42 DIZZINESS: ICD-10-CM

## 2025-08-26 PROCEDURE — 3074F SYST BP LT 130 MM HG: CPT | Performed by: PSYCHIATRY & NEUROLOGY

## 2025-08-26 PROCEDURE — 99205 OFFICE O/P NEW HI 60 MIN: CPT | Performed by: PSYCHIATRY & NEUROLOGY

## 2025-08-26 PROCEDURE — 99417 PROLNG OP E/M EACH 15 MIN: CPT | Performed by: PSYCHIATRY & NEUROLOGY

## 2025-08-26 PROCEDURE — 3078F DIAST BP <80 MM HG: CPT | Performed by: PSYCHIATRY & NEUROLOGY

## 2025-08-26 RX ORDER — FAMOTIDINE 20 MG/1
20 TABLET, FILM COATED ORAL AT BEDTIME
COMMUNITY
Start: 2024-10-17

## 2025-08-26 RX ORDER — FLUTICASONE PROPIONATE 50 MCG
1 SPRAY, SUSPENSION (ML) NASAL
COMMUNITY

## 2025-08-26 RX ORDER — GABAPENTIN 300 MG/1
300 CAPSULE ORAL AT BEDTIME
COMMUNITY
Start: 2024-09-19